# Patient Record
Sex: FEMALE | Race: WHITE | NOT HISPANIC OR LATINO | Employment: FULL TIME | URBAN - NONMETROPOLITAN AREA
[De-identification: names, ages, dates, MRNs, and addresses within clinical notes are randomized per-mention and may not be internally consistent; named-entity substitution may affect disease eponyms.]

---

## 2017-09-13 ENCOUNTER — OFFICE VISIT (OUTPATIENT)
Dept: FAMILY MEDICINE CLINIC | Facility: CLINIC | Age: 47
End: 2017-09-13

## 2017-09-13 ENCOUNTER — APPOINTMENT (OUTPATIENT)
Dept: LAB | Facility: HOSPITAL | Age: 47
End: 2017-09-13

## 2017-09-13 VITALS
SYSTOLIC BLOOD PRESSURE: 128 MMHG | WEIGHT: 155 LBS | HEIGHT: 60 IN | DIASTOLIC BLOOD PRESSURE: 80 MMHG | BODY MASS INDEX: 30.43 KG/M2

## 2017-09-13 DIAGNOSIS — R53.81 MALAISE: ICD-10-CM

## 2017-09-13 DIAGNOSIS — M10.072 ACUTE IDIOPATHIC GOUT OF LEFT FOOT: Primary | ICD-10-CM

## 2017-09-13 LAB
25(OH)D3 SERPL-MCNC: 34 NG/ML (ref 30–100)
ALBUMIN SERPL-MCNC: 4.3 G/DL (ref 3.4–4.8)
ALBUMIN/GLOB SERPL: 1.3 G/DL (ref 1.1–1.8)
ALP SERPL-CCNC: 71 U/L (ref 38–126)
ALT SERPL W P-5'-P-CCNC: 35 U/L (ref 9–52)
ANION GAP SERPL CALCULATED.3IONS-SCNC: 11 MMOL/L (ref 5–15)
AST SERPL-CCNC: 34 U/L (ref 14–36)
BASOPHILS # BLD AUTO: 0.02 10*3/MM3 (ref 0–0.2)
BASOPHILS NFR BLD AUTO: 0.3 % (ref 0–2)
BILIRUB SERPL-MCNC: 1.6 MG/DL (ref 0.2–1.3)
BUN BLD-MCNC: 11 MG/DL (ref 7–21)
BUN/CREAT SERPL: 13.6 (ref 7–25)
CALCIUM SPEC-SCNC: 8.9 MG/DL (ref 8.4–10.2)
CHLORIDE SERPL-SCNC: 107 MMOL/L (ref 95–110)
CO2 SERPL-SCNC: 22 MMOL/L (ref 22–31)
CREAT BLD-MCNC: 0.81 MG/DL (ref 0.5–1)
DEPRECATED RDW RBC AUTO: 41.6 FL (ref 36.4–46.3)
EOSINOPHIL # BLD AUTO: 0.06 10*3/MM3 (ref 0–0.7)
EOSINOPHIL NFR BLD AUTO: 0.8 % (ref 0–7)
ERYTHROCYTE [DISTWIDTH] IN BLOOD BY AUTOMATED COUNT: 12.9 % (ref 11.5–14.5)
GFR SERPL CREATININE-BSD FRML MDRD: 76 ML/MIN/1.73 (ref 58–135)
GLOBULIN UR ELPH-MCNC: 3.2 GM/DL (ref 2.3–3.5)
GLUCOSE BLD-MCNC: 90 MG/DL (ref 60–100)
HCT VFR BLD AUTO: 40.1 % (ref 35–45)
HGB BLD-MCNC: 13.6 G/DL (ref 12–15.5)
IMM GRANULOCYTES # BLD: 0.01 10*3/MM3 (ref 0–0.02)
IMM GRANULOCYTES NFR BLD: 0.1 % (ref 0–0.5)
LYMPHOCYTES # BLD AUTO: 2.04 10*3/MM3 (ref 0.6–4.2)
LYMPHOCYTES NFR BLD AUTO: 26.8 % (ref 10–50)
MCH RBC QN AUTO: 30.1 PG (ref 26.5–34)
MCHC RBC AUTO-ENTMCNC: 33.9 G/DL (ref 31.4–36)
MCV RBC AUTO: 88.7 FL (ref 80–98)
MONOCYTES # BLD AUTO: 0.64 10*3/MM3 (ref 0–0.9)
MONOCYTES NFR BLD AUTO: 8.4 % (ref 0–12)
NEUTROPHILS # BLD AUTO: 4.83 10*3/MM3 (ref 2–8.6)
NEUTROPHILS NFR BLD AUTO: 63.6 % (ref 37–80)
PLATELET # BLD AUTO: 187 10*3/MM3 (ref 150–450)
PMV BLD AUTO: 11.5 FL (ref 8–12)
POTASSIUM BLD-SCNC: 4.2 MMOL/L (ref 3.5–5.1)
PROT SERPL-MCNC: 7.5 G/DL (ref 6.3–8.6)
PTH-INTACT SERPL-MCNC: 68.1 PG/ML (ref 10–65)
RBC # BLD AUTO: 4.52 10*6/MM3 (ref 3.77–5.16)
SODIUM BLD-SCNC: 140 MMOL/L (ref 137–145)
TSH SERPL DL<=0.05 MIU/L-ACNC: 2.85 MIU/ML (ref 0.46–4.68)
URATE SERPL-MCNC: 4.2 MG/DL (ref 2.5–8.5)
WBC NRBC COR # BLD: 7.6 10*3/MM3 (ref 3.2–9.8)

## 2017-09-13 PROCEDURE — 83970 ASSAY OF PARATHORMONE: CPT | Performed by: NURSE PRACTITIONER

## 2017-09-13 PROCEDURE — 84443 ASSAY THYROID STIM HORMONE: CPT | Performed by: NURSE PRACTITIONER

## 2017-09-13 PROCEDURE — 96372 THER/PROPH/DIAG INJ SC/IM: CPT | Performed by: NURSE PRACTITIONER

## 2017-09-13 PROCEDURE — 85025 COMPLETE CBC W/AUTO DIFF WBC: CPT | Performed by: NURSE PRACTITIONER

## 2017-09-13 PROCEDURE — 82306 VITAMIN D 25 HYDROXY: CPT | Performed by: NURSE PRACTITIONER

## 2017-09-13 PROCEDURE — 99213 OFFICE O/P EST LOW 20 MIN: CPT | Performed by: NURSE PRACTITIONER

## 2017-09-13 PROCEDURE — 84550 ASSAY OF BLOOD/URIC ACID: CPT | Performed by: NURSE PRACTITIONER

## 2017-09-13 PROCEDURE — 80053 COMPREHEN METABOLIC PANEL: CPT | Performed by: NURSE PRACTITIONER

## 2017-09-13 PROCEDURE — 36415 COLL VENOUS BLD VENIPUNCTURE: CPT | Performed by: NURSE PRACTITIONER

## 2017-09-13 RX ORDER — IBUPROFEN 200 MG
200 TABLET ORAL EVERY 6 HOURS PRN
COMMUNITY
End: 2018-03-22

## 2017-09-13 RX ORDER — METHYLPREDNISOLONE 4 MG/1
TABLET ORAL
Qty: 21 EACH | Refills: 0 | Status: SHIPPED | OUTPATIENT
Start: 2017-09-13 | End: 2018-03-22

## 2017-09-13 RX ORDER — TRIAMCINOLONE ACETONIDE 40 MG/ML
80 INJECTION, SUSPENSION INTRA-ARTICULAR; INTRAMUSCULAR ONCE
Status: COMPLETED | OUTPATIENT
Start: 2017-09-13 | End: 2017-09-13

## 2017-09-13 RX ADMIN — TRIAMCINOLONE ACETONIDE 80 MG: 40 INJECTION, SUSPENSION INTRA-ARTICULAR; INTRAMUSCULAR at 12:00

## 2017-09-13 NOTE — PROGRESS NOTES
Chief Complaint   Patient presents with   • Foot Pain     started on Sat and great toe pain left foot     Subjective   Karena Kent is a 47 y.o. female.     Foot Pain   This is a recurrent problem. The current episode started 1 to 4 weeks ago. The problem occurs constantly. The problem has been gradually worsening. Associated symptoms include arthralgias, fatigue, joint swelling and myalgias. Pertinent negatives include no abdominal pain, anorexia, change in bowel habit, chest pain, chills, congestion, coughing, diaphoresis, fever, headaches, nausea, neck pain, numbness, rash, sore throat, visual change, vomiting or weakness. Nothing aggravates the symptoms. Treatments tried: left foot pain  The treatment provided no relief.        The following portions of the patient's history were reviewed and updated as appropriate: allergies, current medications, past social history and problem list.    Review of Systems   Constitutional: Positive for fatigue. Negative for chills, diaphoresis and fever.   HENT: Negative.  Negative for congestion and sore throat.    Eyes: Negative.    Respiratory: Negative.  Negative for apnea, cough, choking, chest tightness, shortness of breath, wheezing and stridor.    Cardiovascular: Negative.  Negative for chest pain, palpitations and leg swelling.   Gastrointestinal: Negative.  Negative for abdominal pain, anorexia, change in bowel habit, nausea and vomiting.   Endocrine: Negative.    Genitourinary: Negative.    Musculoskeletal: Positive for arthralgias, back pain, gait problem, joint swelling and myalgias. Negative for neck pain and neck stiffness.   Skin: Negative.  Negative for rash.   Allergic/Immunologic: Negative.  Negative for environmental allergies, food allergies and immunocompromised state.   Neurological: Negative for weakness, numbness and headaches.   Hematological: Negative.    Psychiatric/Behavioral: Negative.    All other systems reviewed and are  "negative.      Objective   /80  Ht 60\" (152.4 cm)  Wt 155 lb (70.3 kg)  BMI 30.27 kg/m2  Physical Exam   Constitutional: She appears well-developed.   HENT:   Head: Normocephalic.   Eyes: Pupils are equal, round, and reactive to light.   Neck: Normal range of motion.   Cardiovascular: Normal rate, regular rhythm, normal heart sounds and normal pulses.    Pulmonary/Chest: Effort normal.   Abdominal: Soft.   Genitourinary: Rectum normal and vagina normal. Pelvic exam was performed with patient supine. Uterus is not deviated, not enlarged, not fixed and not tender. Cervix exhibits no motion tenderness, no discharge and no friability. Right adnexum displays no mass. Left adnexum displays no mass.   Musculoskeletal: Normal range of motion. She exhibits tenderness.        Neurological: She is alert.   Psychiatric: She has a normal mood and affect.   Nursing note and vitals reviewed.      Assessment/Plan   Problem List Items Addressed This Visit        Musculoskeletal and Integument    Acute idiopathic gout of left foot - Primary    Relevant Medications    triamcinolone acetonide (KENALOG-40) injection 80 mg (Completed)    Other Relevant Orders    TSH    Comprehensive Metabolic Panel (Completed)    CBC & Differential (Completed)    Vitamin D 25 Hydroxy    Uric acid (Completed)    PTH, Intact    CBC Auto Differential (Completed)       Other    Malaise    Relevant Orders    TSH    Comprehensive Metabolic Panel (Completed)    CBC & Differential (Completed)    Vitamin D 25 Hydroxy    Uric acid (Completed)    PTH, Intact    CBC Auto Differential (Completed)           New Medications Ordered This Visit   Medications   • ibuprofen (ADVIL,MOTRIN) 200 MG tablet     Sig: Take 200 mg by mouth Every 6 (Six) Hours As Needed for Mild Pain .   • triamcinolone acetonide (KENALOG-40) injection 80 mg      ice to area, meds as directed   "

## 2017-09-14 DIAGNOSIS — M10.00 ACUTE IDIOPATHIC GOUT, UNSPECIFIED SITE: Primary | ICD-10-CM

## 2017-09-14 DIAGNOSIS — Z00.00 GENERAL MEDICAL EXAM: ICD-10-CM

## 2017-10-15 ENCOUNTER — LAB (OUTPATIENT)
Dept: LAB | Facility: HOSPITAL | Age: 47
End: 2017-10-15

## 2017-10-15 DIAGNOSIS — M10.00 ACUTE IDIOPATHIC GOUT, UNSPECIFIED SITE: ICD-10-CM

## 2017-10-15 DIAGNOSIS — Z00.00 GENERAL MEDICAL EXAM: ICD-10-CM

## 2017-10-15 LAB
ARTICHOKE IGE QN: 90 MG/DL (ref 1–129)
CHOLEST SERPL-MCNC: 183 MG/DL (ref 0–199)
HDLC SERPL-MCNC: 62 MG/DL (ref 60–200)
LDLC/HDLC SERPL: 1.62 {RATIO} (ref 0–3.22)
TRIGL SERPL-MCNC: 104 MG/DL (ref 20–199)
TSH SERPL DL<=0.05 MIU/L-ACNC: 2.93 MIU/ML (ref 0.46–4.68)

## 2017-10-15 PROCEDURE — 83970 ASSAY OF PARATHORMONE: CPT

## 2017-10-15 PROCEDURE — 36415 COLL VENOUS BLD VENIPUNCTURE: CPT | Performed by: NURSE PRACTITIONER

## 2017-10-15 PROCEDURE — 36415 COLL VENOUS BLD VENIPUNCTURE: CPT

## 2017-10-15 PROCEDURE — 80061 LIPID PANEL: CPT | Performed by: NURSE PRACTITIONER

## 2017-10-15 PROCEDURE — 84443 ASSAY THYROID STIM HORMONE: CPT | Performed by: NURSE PRACTITIONER

## 2017-10-15 PROCEDURE — 82310 ASSAY OF CALCIUM: CPT

## 2017-10-18 LAB
CALCIUM SPEC-SCNC: 9.5 MG/DL (ref 8.4–10.2)
PTH-INTACT SERPL-MCNC: 40 PG/ML (ref 10–65)

## 2018-02-15 ENCOUNTER — TELEPHONE (OUTPATIENT)
Dept: FAMILY MEDICINE CLINIC | Facility: CLINIC | Age: 48
End: 2018-02-15

## 2018-02-15 DIAGNOSIS — Z12.31 ENCOUNTER FOR SCREENING MAMMOGRAM FOR MALIGNANT NEOPLASM OF BREAST: Primary | ICD-10-CM

## 2018-03-22 ENCOUNTER — OFFICE VISIT (OUTPATIENT)
Dept: FAMILY MEDICINE CLINIC | Facility: CLINIC | Age: 48
End: 2018-03-22

## 2018-03-22 ENCOUNTER — APPOINTMENT (OUTPATIENT)
Dept: LAB | Facility: HOSPITAL | Age: 48
End: 2018-03-22

## 2018-03-22 VITALS
BODY MASS INDEX: 29.45 KG/M2 | SYSTOLIC BLOOD PRESSURE: 118 MMHG | HEIGHT: 60 IN | DIASTOLIC BLOOD PRESSURE: 82 MMHG | WEIGHT: 150 LBS

## 2018-03-22 DIAGNOSIS — Z00.00 WELLNESS EXAMINATION: Primary | ICD-10-CM

## 2018-03-22 LAB
25(OH)D3 SERPL-MCNC: 21.5 NG/ML (ref 30–100)
ALBUMIN SERPL-MCNC: 4.2 G/DL (ref 3.4–4.8)
ALBUMIN/GLOB SERPL: 1.3 G/DL (ref 1.1–1.8)
ALP SERPL-CCNC: 69 U/L (ref 38–126)
ALT SERPL W P-5'-P-CCNC: 33 U/L (ref 9–52)
ANION GAP SERPL CALCULATED.3IONS-SCNC: 15 MMOL/L (ref 5–15)
ARTICHOKE IGE QN: 94 MG/DL (ref 1–129)
AST SERPL-CCNC: 76 U/L (ref 14–36)
BASOPHILS # BLD AUTO: 0.01 10*3/MM3 (ref 0–0.2)
BASOPHILS NFR BLD AUTO: 0.1 % (ref 0–2)
BILIRUB SERPL-MCNC: 0.9 MG/DL (ref 0.2–1.3)
BUN BLD-MCNC: 12 MG/DL (ref 7–21)
BUN/CREAT SERPL: 16 (ref 7–25)
CALCIUM SPEC-SCNC: 8.9 MG/DL (ref 8.4–10.2)
CHLORIDE SERPL-SCNC: 103 MMOL/L (ref 95–110)
CHOLEST SERPL-MCNC: 189 MG/DL (ref 0–199)
CO2 SERPL-SCNC: 23 MMOL/L (ref 22–31)
CREAT BLD-MCNC: 0.75 MG/DL (ref 0.5–1)
DEPRECATED RDW RBC AUTO: 42.3 FL (ref 36.4–46.3)
EOSINOPHIL # BLD AUTO: 0.02 10*3/MM3 (ref 0–0.7)
EOSINOPHIL NFR BLD AUTO: 0.2 % (ref 0–7)
ERYTHROCYTE [DISTWIDTH] IN BLOOD BY AUTOMATED COUNT: 12.8 % (ref 11.5–14.5)
GFR SERPL CREATININE-BSD FRML MDRD: 83 ML/MIN/1.73 (ref 58–135)
GLOBULIN UR ELPH-MCNC: 3.3 GM/DL (ref 2.3–3.5)
GLUCOSE BLD-MCNC: 86 MG/DL (ref 60–100)
HCT VFR BLD AUTO: 41.1 % (ref 35–45)
HDLC SERPL-MCNC: 55 MG/DL (ref 60–200)
HGB BLD-MCNC: 14.2 G/DL (ref 12–15.5)
IMM GRANULOCYTES # BLD: 0.02 10*3/MM3 (ref 0–0.02)
IMM GRANULOCYTES NFR BLD: 0.2 % (ref 0–0.5)
IRON 24H UR-MRATE: 69 MCG/DL (ref 37–170)
LDLC/HDLC SERPL: 1.82 {RATIO} (ref 0–3.22)
LYMPHOCYTES # BLD AUTO: 2.19 10*3/MM3 (ref 0.6–4.2)
LYMPHOCYTES NFR BLD AUTO: 26.6 % (ref 10–50)
MAGNESIUM SERPL-MCNC: 1.9 MG/DL (ref 1.6–2.3)
MCH RBC QN AUTO: 31.1 PG (ref 26.5–34)
MCHC RBC AUTO-ENTMCNC: 34.5 G/DL (ref 31.4–36)
MCV RBC AUTO: 90.1 FL (ref 80–98)
MONOCYTES # BLD AUTO: 0.49 10*3/MM3 (ref 0–0.9)
MONOCYTES NFR BLD AUTO: 5.9 % (ref 0–12)
NEUTROPHILS # BLD AUTO: 5.51 10*3/MM3 (ref 2–8.6)
NEUTROPHILS NFR BLD AUTO: 67 % (ref 37–80)
PLATELET # BLD AUTO: 218 10*3/MM3 (ref 150–450)
PMV BLD AUTO: 11.2 FL (ref 8–12)
POTASSIUM BLD-SCNC: 3.6 MMOL/L (ref 3.5–5.1)
PROT SERPL-MCNC: 7.5 G/DL (ref 6.3–8.6)
RBC # BLD AUTO: 4.56 10*6/MM3 (ref 3.77–5.16)
SODIUM BLD-SCNC: 141 MMOL/L (ref 137–145)
TRIGL SERPL-MCNC: 169 MG/DL (ref 20–199)
TSH SERPL DL<=0.05 MIU/L-ACNC: 0.44 MIU/ML (ref 0.46–4.68)
VIT B12 BLD-MCNC: 477 PG/ML (ref 239–931)
WBC NRBC COR # BLD: 8.24 10*3/MM3 (ref 3.2–9.8)

## 2018-03-22 PROCEDURE — 80061 LIPID PANEL: CPT | Performed by: NURSE PRACTITIONER

## 2018-03-22 PROCEDURE — 85025 COMPLETE CBC W/AUTO DIFF WBC: CPT | Performed by: NURSE PRACTITIONER

## 2018-03-22 PROCEDURE — 83540 ASSAY OF IRON: CPT | Performed by: NURSE PRACTITIONER

## 2018-03-22 PROCEDURE — 83735 ASSAY OF MAGNESIUM: CPT | Performed by: NURSE PRACTITIONER

## 2018-03-22 PROCEDURE — 82607 VITAMIN B-12: CPT | Performed by: NURSE PRACTITIONER

## 2018-03-22 PROCEDURE — 82306 VITAMIN D 25 HYDROXY: CPT | Performed by: NURSE PRACTITIONER

## 2018-03-22 PROCEDURE — 99396 PREV VISIT EST AGE 40-64: CPT | Performed by: NURSE PRACTITIONER

## 2018-03-22 PROCEDURE — 84443 ASSAY THYROID STIM HORMONE: CPT | Performed by: NURSE PRACTITIONER

## 2018-03-22 PROCEDURE — 90715 TDAP VACCINE 7 YRS/> IM: CPT | Performed by: NURSE PRACTITIONER

## 2018-03-22 PROCEDURE — 90471 IMMUNIZATION ADMIN: CPT | Performed by: NURSE PRACTITIONER

## 2018-03-22 PROCEDURE — 36415 COLL VENOUS BLD VENIPUNCTURE: CPT | Performed by: NURSE PRACTITIONER

## 2018-03-22 PROCEDURE — 80053 COMPREHEN METABOLIC PANEL: CPT | Performed by: NURSE PRACTITIONER

## 2018-03-22 NOTE — PROGRESS NOTES
"  Chief Complaint   Patient presents with   • Annual Exam     yearly check up , mammogram ordered for today     Subjective   Karena Kent is a 48 y.o. female.     General exam for health care needs -no major complaints          The following portions of the patient's history were reviewed and updated as appropriate: allergies, current medications, past social history and problem list.    Review of Systems   Constitutional: Positive for fatigue. Negative for activity change, appetite change, chills, diaphoresis, fever and unexpected weight change.   HENT: Negative.    Eyes: Negative.    Respiratory: Negative.  Negative for apnea, cough, choking, chest tightness, wheezing and stridor.    Cardiovascular: Negative.  Negative for chest pain, palpitations and leg swelling.   Gastrointestinal: Negative.  Negative for abdominal distention and abdominal pain.   Endocrine: Negative.  Negative for cold intolerance, heat intolerance, polydipsia, polyphagia and polyuria.   Genitourinary: Negative.    Musculoskeletal: Negative.    Skin: Negative.    Allergic/Immunologic: Negative.    Neurological: Negative.    Hematological: Negative.    Psychiatric/Behavioral: Negative.    All other systems reviewed and are negative.      Objective   /82   Ht 152.4 cm (60\")   Wt 68 kg (150 lb)   BMI 29.29 kg/m²   Physical Exam   Constitutional: She is oriented to person, place, and time. She appears well-developed and well-nourished. No distress.   HENT:   Head: Normocephalic and atraumatic.   Mouth/Throat: No oropharyngeal exudate.   Eyes: EOM are normal. Pupils are equal, round, and reactive to light. Right eye exhibits no discharge. Left eye exhibits no discharge. No scleral icterus.   Neck: Normal range of motion. Neck supple.   Cardiovascular: Normal rate, regular rhythm, normal heart sounds and intact distal pulses.  Exam reveals no gallop and no friction rub.    No murmur heard.  Pulmonary/Chest: Effort normal and breath " sounds normal.   Abdominal: Soft. Bowel sounds are normal.   Musculoskeletal: Normal range of motion. She exhibits no edema, tenderness or deformity.   Neurological: She is alert and oriented to person, place, and time. She has normal reflexes. She displays normal reflexes. No cranial nerve deficit. She exhibits normal muscle tone. Coordination normal.   Skin: Skin is warm and dry. She is not diaphoretic.   Nursing note and vitals reviewed.      Assessment/Plan   Problem List Items Addressed This Visit        Other    General medical exam - Primary      Other Visit Diagnoses    None.        No orders of the defined types were placed in this encounter.      It's not just what you eat, but when you eat  Eat breakfast, and eat smaller meals throughout the day. A healthy breakfast can jumpstart your metabolism, while eating small, healthy meals (rather than the standard three large meals) keeps your energy up.   Avoid eating at night. Try to eat dinner earlier and fast for 14-16 hours until breakfast the next morning. Studies suggest that eating only when you’re most active and giving your digestive system a long break each day may help to regulate weight.     Current Outpatient Prescriptions on File Prior to Visit   Medication Sig Dispense Refill   • Biotin 2500 MCG capsule Take  by mouth.     • Multiple Vitamins-Minerals (MULTIVITAMIN ADULT PO) Take  by mouth.     • [DISCONTINUED] diclofenac (VOLTAREN) 50 MG EC tablet Take 1 tablet by mouth 2 (Two) Times a Day. 60 tablet 2   • [DISCONTINUED] ibuprofen (ADVIL,MOTRIN) 200 MG tablet Take 200 mg by mouth Every 6 (Six) Hours As Needed for Mild Pain .     • [DISCONTINUED] MethylPREDNISolone (MEDROL, JESUS,) 4 MG tablet Take as directed on package instructions. 21 each 0     No current facility-administered medications on file prior to visit.        NO CHANGES NEEDED AT THIS TIME, WILL FOLLOW UP AS DIRECTED ONCE  A YEAR, MAMMOGRAM TODAY

## 2018-07-19 ENCOUNTER — APPOINTMENT (OUTPATIENT)
Dept: LAB | Facility: HOSPITAL | Age: 48
End: 2018-07-19

## 2018-07-19 ENCOUNTER — OFFICE VISIT (OUTPATIENT)
Dept: FAMILY MEDICINE CLINIC | Facility: CLINIC | Age: 48
End: 2018-07-19

## 2018-07-19 VITALS
WEIGHT: 140.8 LBS | BODY MASS INDEX: 27.64 KG/M2 | HEIGHT: 60 IN | SYSTOLIC BLOOD PRESSURE: 120 MMHG | DIASTOLIC BLOOD PRESSURE: 82 MMHG

## 2018-07-19 DIAGNOSIS — H53.9 VISION CHANGES: ICD-10-CM

## 2018-07-19 DIAGNOSIS — R23.2 HOT FLASHES: ICD-10-CM

## 2018-07-19 DIAGNOSIS — R51.9 NEW ONSET HEADACHE: Primary | ICD-10-CM

## 2018-07-19 DIAGNOSIS — R51.9 NEW ONSET OF HEADACHES: ICD-10-CM

## 2018-07-19 DIAGNOSIS — H57.13 PAIN OF BOTH EYES: Primary | ICD-10-CM

## 2018-07-19 DIAGNOSIS — H57.13 PAIN OF BOTH EYES: ICD-10-CM

## 2018-07-19 LAB
25(OH)D3 SERPL-MCNC: 43.3 NG/ML (ref 30–100)
ALBUMIN SERPL-MCNC: 4.6 G/DL (ref 3.4–4.8)
ALBUMIN/GLOB SERPL: 1.4 G/DL (ref 1.1–1.8)
ALP SERPL-CCNC: 57 U/L (ref 38–126)
ALT SERPL W P-5'-P-CCNC: 18 U/L (ref 9–52)
ANION GAP SERPL CALCULATED.3IONS-SCNC: 11 MMOL/L (ref 5–15)
ARTICHOKE IGE QN: 80 MG/DL (ref 1–129)
AST SERPL-CCNC: 35 U/L (ref 14–36)
BASOPHILS # BLD AUTO: 0.02 10*3/MM3 (ref 0–0.2)
BASOPHILS NFR BLD AUTO: 0.4 % (ref 0–2)
BILIRUB SERPL-MCNC: 1.1 MG/DL (ref 0.2–1.3)
BUN BLD-MCNC: 18 MG/DL (ref 7–21)
BUN/CREAT SERPL: 25 (ref 7–25)
CALCIUM SPEC-SCNC: 8.9 MG/DL (ref 8.4–10.2)
CHLORIDE SERPL-SCNC: 106 MMOL/L (ref 95–110)
CHOLEST SERPL-MCNC: 158 MG/DL (ref 0–199)
CO2 SERPL-SCNC: 21 MMOL/L (ref 22–31)
CREAT BLD-MCNC: 0.72 MG/DL (ref 0.5–1)
DEPRECATED RDW RBC AUTO: 40.8 FL (ref 36.4–46.3)
EOSINOPHIL # BLD AUTO: 0.04 10*3/MM3 (ref 0–0.7)
EOSINOPHIL NFR BLD AUTO: 0.8 % (ref 0–7)
ERYTHROCYTE [DISTWIDTH] IN BLOOD BY AUTOMATED COUNT: 12.9 % (ref 11.5–14.5)
GFR SERPL CREATININE-BSD FRML MDRD: 87 ML/MIN/1.73 (ref 58–135)
GLOBULIN UR ELPH-MCNC: 3.3 GM/DL (ref 2.3–3.5)
GLUCOSE BLD-MCNC: 88 MG/DL (ref 60–100)
HCT VFR BLD AUTO: 40.7 % (ref 35–45)
HDLC SERPL-MCNC: 43 MG/DL (ref 60–200)
HGB BLD-MCNC: 14.2 G/DL (ref 12–15.5)
IMM GRANULOCYTES # BLD: 0.01 10*3/MM3 (ref 0–0.02)
IMM GRANULOCYTES NFR BLD: 0.2 % (ref 0–0.5)
LDLC/HDLC SERPL: 2.21 {RATIO} (ref 0–3.22)
LYMPHOCYTES # BLD AUTO: 1.66 10*3/MM3 (ref 0.6–4.2)
LYMPHOCYTES NFR BLD AUTO: 34.2 % (ref 10–50)
MCH RBC QN AUTO: 30.5 PG (ref 26.5–34)
MCHC RBC AUTO-ENTMCNC: 34.9 G/DL (ref 31.4–36)
MCV RBC AUTO: 87.3 FL (ref 80–98)
MONOCYTES # BLD AUTO: 0.36 10*3/MM3 (ref 0–0.9)
MONOCYTES NFR BLD AUTO: 7.4 % (ref 0–12)
NEUTROPHILS # BLD AUTO: 2.77 10*3/MM3 (ref 2–8.6)
NEUTROPHILS NFR BLD AUTO: 57 % (ref 37–80)
NRBC BLD MANUAL-RTO: 0 /100 WBC (ref 0–0)
PLATELET # BLD AUTO: 195 10*3/MM3 (ref 150–450)
PMV BLD AUTO: 11.3 FL (ref 8–12)
POTASSIUM BLD-SCNC: 4 MMOL/L (ref 3.5–5.1)
PROT SERPL-MCNC: 7.9 G/DL (ref 6.3–8.6)
RBC # BLD AUTO: 4.66 10*6/MM3 (ref 3.77–5.16)
SODIUM BLD-SCNC: 138 MMOL/L (ref 137–145)
TRIGL SERPL-MCNC: 100 MG/DL (ref 20–199)
TSH SERPL DL<=0.05 MIU/L-ACNC: 1.45 MIU/ML (ref 0.46–4.68)
VIT B12 BLD-MCNC: 703 PG/ML (ref 239–931)
WBC NRBC COR # BLD: 4.86 10*3/MM3 (ref 3.2–9.8)

## 2018-07-19 PROCEDURE — 82607 VITAMIN B-12: CPT | Performed by: NURSE PRACTITIONER

## 2018-07-19 PROCEDURE — 82670 ASSAY OF TOTAL ESTRADIOL: CPT | Performed by: NURSE PRACTITIONER

## 2018-07-19 PROCEDURE — 85025 COMPLETE CBC W/AUTO DIFF WBC: CPT | Performed by: NURSE PRACTITIONER

## 2018-07-19 PROCEDURE — 84443 ASSAY THYROID STIM HORMONE: CPT | Performed by: NURSE PRACTITIONER

## 2018-07-19 PROCEDURE — 82306 VITAMIN D 25 HYDROXY: CPT | Performed by: NURSE PRACTITIONER

## 2018-07-19 PROCEDURE — 36415 COLL VENOUS BLD VENIPUNCTURE: CPT | Performed by: NURSE PRACTITIONER

## 2018-07-19 PROCEDURE — 84144 ASSAY OF PROGESTERONE: CPT | Performed by: NURSE PRACTITIONER

## 2018-07-19 PROCEDURE — 80061 LIPID PANEL: CPT | Performed by: NURSE PRACTITIONER

## 2018-07-19 PROCEDURE — 99213 OFFICE O/P EST LOW 20 MIN: CPT | Performed by: NURSE PRACTITIONER

## 2018-07-19 PROCEDURE — 84403 ASSAY OF TOTAL TESTOSTERONE: CPT | Performed by: NURSE PRACTITIONER

## 2018-07-19 PROCEDURE — 84402 ASSAY OF FREE TESTOSTERONE: CPT | Performed by: NURSE PRACTITIONER

## 2018-07-19 PROCEDURE — 80053 COMPREHEN METABOLIC PANEL: CPT | Performed by: NURSE PRACTITIONER

## 2018-07-19 RX ORDER — PAROXETINE 10 MG/1
10 TABLET, FILM COATED ORAL EVERY MORNING
Qty: 30 TABLET | Refills: 11 | Status: SHIPPED | OUTPATIENT
Start: 2018-07-19 | End: 2019-11-25 | Stop reason: SDUPTHER

## 2018-07-19 NOTE — PROGRESS NOTES
"  Chief Complaint   Patient presents with   • Eye pain     keeps recurring      Subjective   Karena Kent is a 48 y.o. female.     Presents with sudden onset headache 7 days ago-brought tears to her eyes-now having vision changes left eye-reports \"feeling like something is stabbing in my left eye\" -no falls or injuries-no hx of the same-does not wear contacts-headache and vision changes are worsening       Eye Pain    Both eyes are affected.This is a recurrent problem. The current episode started more than 1 month ago. The problem occurs intermittently. The problem has been gradually worsening. There was no injury mechanism. The pain is at a severity of 6/10. The pain is moderate. There is no known exposure to pink eye. She does not wear contacts. Associated symptoms include blurred vision and photophobia. Pertinent negatives include no eye discharge, double vision, eye redness, fever, foreign body sensation, itching, nausea, recent URI, tingling, vomiting or weakness. She has tried nothing for the symptoms. The treatment provided mild relief.   Headache    This is a new problem. The current episode started in the past 7 days. The problem occurs constantly. The problem has been rapidly worsening. The pain is located in the parietal, temporal and frontal region. The pain radiates to the face. The pain quality is not similar to prior headaches. The quality of the pain is described as stabbing, throbbing and thunderclap. Associated symptoms include blurred vision, dizziness, eye pain, a loss of balance, photophobia and a visual change. Pertinent negatives include no abdominal pain, abnormal behavior, anorexia, back pain, coughing, drainage, ear pain, eye redness, facial sweating, fever, hearing loss, insomnia, muscle aches, nausea, neck pain, numbness, scalp tenderness, seizures, sinus pressure, sore throat, swollen glands, tingling, tinnitus, vomiting or weakness.        The following portions of the " "patient's history were reviewed and updated as appropriate: allergies, current medications, past social history and problem list.    Review of Systems   Constitutional: Negative for activity change, appetite change, chills, diaphoresis, fatigue, fever and unexpected weight change.   HENT: Negative for congestion, dental problem, drooling, ear discharge, ear pain, facial swelling, hearing loss, sinus pressure, sore throat and tinnitus.    Eyes: Positive for blurred vision, photophobia, pain and visual disturbance. Negative for double vision, discharge, redness and itching.   Respiratory: Negative.  Negative for apnea, cough, choking, chest tightness, shortness of breath, wheezing and stridor.    Cardiovascular: Negative.  Negative for chest pain, palpitations and leg swelling.   Gastrointestinal: Negative.  Negative for abdominal pain, anorexia, nausea and vomiting.   Endocrine: Negative.  Negative for cold intolerance, heat intolerance, polydipsia, polyphagia and polyuria.   Genitourinary: Positive for menstrual problem. Negative for decreased urine volume, difficulty urinating, dyspareunia, dysuria, enuresis, flank pain, frequency, genital sores, pelvic pain, urgency, vaginal bleeding, vaginal discharge and vaginal pain.        Increase in hot flashes hx of ablation    Musculoskeletal: Negative.  Negative for back pain and neck pain.   Skin: Negative.    Allergic/Immunologic: Negative.  Negative for environmental allergies, food allergies and immunocompromised state.   Neurological: Positive for dizziness, headaches and loss of balance. Negative for tingling, seizures, facial asymmetry, weakness and numbness.   Hematological: Negative.  Negative for adenopathy. Does not bruise/bleed easily.   Psychiatric/Behavioral: Negative.  The patient does not have insomnia.        Objective   /82   Ht 152.4 cm (60\")   Wt 63.9 kg (140 lb 12.8 oz)   BMI 27.50 kg/m²   Physical Exam   Constitutional: She is oriented to " person, place, and time. Vital signs are normal. She appears ill.   HENT:   Head: Normocephalic and atraumatic.   Mouth/Throat: No oropharyngeal exudate.   Eyes: Pupils are equal, round, and reactive to light. EOM are normal. Left eye exhibits no discharge. No scleral icterus.   Neck: Normal range of motion. Neck supple.   Cardiovascular: Normal rate, regular rhythm and normal heart sounds.  Exam reveals no gallop and no friction rub.    No murmur heard.  Pulmonary/Chest: Effort normal and breath sounds normal. No respiratory distress. She has no wheezes. She has no rales. She exhibits no tenderness.   Abdominal: Soft. Bowel sounds are normal.   Musculoskeletal: Normal range of motion.   Neurological: She is alert and oriented to person, place, and time.   Skin: Skin is warm.   Nursing note and vitals reviewed.      Assessment/Plan   Problem List Items Addressed This Visit        Cardiovascular and Mediastinum    Hot flashes    Relevant Orders    Progesterone    Estradiol    Testosterone, F Eqlib & T LC / MS    CBC & Differential    Comprehensive Metabolic Panel    Lipid Panel    Vitamin B12    TSH    Vitamin D 25 Hydroxy    CBC Auto Differential       Nervous and Auditory    Pain of both eyes    Relevant Orders    MRI brain wo contrast    Progesterone    Estradiol    Testosterone, F Eqlib & T LC / MS    CBC & Differential    Comprehensive Metabolic Panel    Lipid Panel    Vitamin B12    TSH    Vitamin D 25 Hydroxy    CBC Auto Differential    New onset headache - Primary    Relevant Orders    MRI brain wo contrast    Progesterone    Estradiol    Testosterone, F Eqlib & T LC / MS    CBC & Differential    Comprehensive Metabolic Panel    Lipid Panel    Vitamin B12    TSH    Vitamin D 25 Hydroxy    CBC Auto Differential       Other    Vision changes    Relevant Orders    MRI brain wo contrast    Progesterone    Estradiol    Testosterone, F Eqlib & T LC / MS    CBC & Differential    Comprehensive Metabolic Panel     Lipid Panel    Vitamin B12    TSH    Vitamin D 25 Hydroxy    CBC Auto Differential           New Medications Ordered This Visit   Medications   • PARoxetine (PAXIL) 10 MG tablet     Sig: Take 1 tablet by mouth Every Morning.     Dispense:  30 tablet     Refill:  11       It's not just what you eat, but when you eat  Eat breakfast, and eat smaller meals throughout the day. A healthy breakfast can jumpstart your metabolism, while eating small, healthy meals (rather than the standard three large meals) keeps your energy up.   Avoid eating at night. Try to eat dinner earlier and fast for 14-16 hours until breakfast the next morning. Studies suggest that eating only when you’re most active and giving your digestive system a long break each day may help to regulate weight.     Add paxil for now and see if this helps her hot flashes      Mri brain as directed, er if worsen, see opthamologist  soon -patient agrees -ER IF worsen

## 2018-07-20 LAB
ESTRADIOL SERPL HS-MCNC: 41.5 PG/ML
PROGEST SERPL-MCNC: 0.2 NG/ML

## 2018-07-24 ENCOUNTER — HOSPITAL ENCOUNTER (OUTPATIENT)
Dept: MRI IMAGING | Facility: HOSPITAL | Age: 48
Discharge: HOME | End: 2018-07-24
Admitting: NURSE PRACTITIONER

## 2018-07-24 DIAGNOSIS — H57.13 PAIN OF BOTH EYES: ICD-10-CM

## 2018-07-24 DIAGNOSIS — H53.9 VISION CHANGES: ICD-10-CM

## 2018-07-24 DIAGNOSIS — R51.9 NEW ONSET OF HEADACHES: ICD-10-CM

## 2018-07-24 LAB
TESTOST FREE MFR SERPL: 1.53 % (ref 0.5–2.8)
TESTOST FREE SERPL-MCNC: 0.25 NG/DL (ref 0.1–0.85)
TESTOST SERPL-MCNC: 16.2 NG/DL

## 2018-07-24 PROCEDURE — 70553 MRI BRAIN STEM W/O & W/DYE: CPT

## 2018-07-24 PROCEDURE — 25010000002 GADOTERIDOL PER 1 ML: Performed by: RADIOLOGY

## 2018-07-24 PROCEDURE — A9576 INJ PROHANCE MULTIPACK: HCPCS | Performed by: RADIOLOGY

## 2018-07-24 RX ADMIN — GADOTERIDOL 14 ML: 279.3 INJECTION, SOLUTION INTRAVENOUS at 15:26

## 2019-11-25 ENCOUNTER — OFFICE VISIT (OUTPATIENT)
Dept: FAMILY MEDICINE CLINIC | Facility: CLINIC | Age: 49
End: 2019-11-25

## 2019-11-25 ENCOUNTER — APPOINTMENT (OUTPATIENT)
Dept: LAB | Facility: HOSPITAL | Age: 49
End: 2019-11-25

## 2019-11-25 VITALS
WEIGHT: 144 LBS | BODY MASS INDEX: 28.27 KG/M2 | DIASTOLIC BLOOD PRESSURE: 80 MMHG | HEIGHT: 60 IN | SYSTOLIC BLOOD PRESSURE: 120 MMHG

## 2019-11-25 DIAGNOSIS — Z12.4 ENCOUNTER FOR PAPANICOLAOU SMEAR FOR CERVICAL CANCER SCREENING: ICD-10-CM

## 2019-11-25 DIAGNOSIS — R05.9 COUGH: ICD-10-CM

## 2019-11-25 DIAGNOSIS — Z12.31 ENCOUNTER FOR SCREENING MAMMOGRAM FOR MALIGNANT NEOPLASM OF BREAST: ICD-10-CM

## 2019-11-25 DIAGNOSIS — Z00.00 WELLNESS EXAMINATION: Primary | ICD-10-CM

## 2019-11-25 LAB
25(OH)D3 SERPL-MCNC: 30.3 NG/ML (ref 30–100)
ALBUMIN SERPL-MCNC: 4.4 G/DL (ref 3.5–5.2)
ALBUMIN/GLOB SERPL: 1.4 G/DL
ALP SERPL-CCNC: 47 U/L (ref 39–117)
ALT SERPL W P-5'-P-CCNC: 14 U/L (ref 1–33)
ANION GAP SERPL CALCULATED.3IONS-SCNC: 14.8 MMOL/L (ref 5–15)
AST SERPL-CCNC: 20 U/L (ref 1–32)
BASOPHILS # BLD AUTO: 0.03 10*3/MM3 (ref 0–0.2)
BASOPHILS NFR BLD AUTO: 0.5 % (ref 0–1.5)
BILIRUB SERPL-MCNC: 0.9 MG/DL (ref 0.2–1.2)
BUN BLD-MCNC: 12 MG/DL (ref 6–20)
BUN/CREAT SERPL: 12.9 (ref 7–25)
CALCIUM SPEC-SCNC: 9 MG/DL (ref 8.6–10.5)
CHLORIDE SERPL-SCNC: 107 MMOL/L (ref 98–107)
CHOLEST SERPL-MCNC: 166 MG/DL (ref 0–200)
CO2 SERPL-SCNC: 20.2 MMOL/L (ref 22–29)
CREAT BLD-MCNC: 0.93 MG/DL (ref 0.57–1)
DEPRECATED RDW RBC AUTO: 39.3 FL (ref 37–54)
EOSINOPHIL # BLD AUTO: 0.07 10*3/MM3 (ref 0–0.4)
EOSINOPHIL NFR BLD AUTO: 1.3 % (ref 0.3–6.2)
ERYTHROCYTE [DISTWIDTH] IN BLOOD BY AUTOMATED COUNT: 12.3 % (ref 12.3–15.4)
GFR SERPL CREATININE-BSD FRML MDRD: 65 ML/MIN/1.73
GLOBULIN UR ELPH-MCNC: 3.1 GM/DL
GLUCOSE BLD-MCNC: 89 MG/DL (ref 65–99)
HBA1C MFR BLD: 5.46 % (ref 4.8–5.6)
HCT VFR BLD AUTO: 39.9 % (ref 34–46.6)
HDLC SERPL-MCNC: 56 MG/DL (ref 40–60)
HGB BLD-MCNC: 14 G/DL (ref 12–15.9)
IMM GRANULOCYTES # BLD AUTO: 0.02 10*3/MM3 (ref 0–0.05)
IMM GRANULOCYTES NFR BLD AUTO: 0.4 % (ref 0–0.5)
IRON 24H UR-MRATE: 64 MCG/DL (ref 37–145)
LDLC SERPL CALC-MCNC: 96 MG/DL (ref 0–100)
LDLC/HDLC SERPL: 1.72 {RATIO}
LYMPHOCYTES # BLD AUTO: 1.88 10*3/MM3 (ref 0.7–3.1)
LYMPHOCYTES NFR BLD AUTO: 34.4 % (ref 19.6–45.3)
MAGNESIUM SERPL-MCNC: 2.2 MG/DL (ref 1.6–2.6)
MCH RBC QN AUTO: 30.8 PG (ref 26.6–33)
MCHC RBC AUTO-ENTMCNC: 35.1 G/DL (ref 31.5–35.7)
MCV RBC AUTO: 87.9 FL (ref 79–97)
MONOCYTES # BLD AUTO: 0.45 10*3/MM3 (ref 0.1–0.9)
MONOCYTES NFR BLD AUTO: 8.2 % (ref 5–12)
NEUTROPHILS # BLD AUTO: 3.02 10*3/MM3 (ref 1.7–7)
NEUTROPHILS NFR BLD AUTO: 55.2 % (ref 42.7–76)
NRBC BLD AUTO-RTO: 0 /100 WBC (ref 0–0.2)
PLATELET # BLD AUTO: 201 10*3/MM3 (ref 140–450)
PMV BLD AUTO: 11.5 FL (ref 6–12)
POTASSIUM BLD-SCNC: 3.8 MMOL/L (ref 3.5–5.2)
PROT SERPL-MCNC: 7.5 G/DL (ref 6–8.5)
RBC # BLD AUTO: 4.54 10*6/MM3 (ref 3.77–5.28)
SODIUM BLD-SCNC: 142 MMOL/L (ref 136–145)
TRIGL SERPL-MCNC: 69 MG/DL (ref 0–150)
TSH SERPL DL<=0.05 MIU/L-ACNC: 3.55 UIU/ML (ref 0.27–4.2)
VIT B12 BLD-MCNC: 679 PG/ML (ref 211–946)
VLDLC SERPL-MCNC: 13.8 MG/DL (ref 5–40)
WBC NRBC COR # BLD: 5.47 10*3/MM3 (ref 3.4–10.8)

## 2019-11-25 PROCEDURE — 82607 VITAMIN B-12: CPT | Performed by: NURSE PRACTITIONER

## 2019-11-25 PROCEDURE — 83540 ASSAY OF IRON: CPT | Performed by: NURSE PRACTITIONER

## 2019-11-25 PROCEDURE — 80053 COMPREHEN METABOLIC PANEL: CPT | Performed by: NURSE PRACTITIONER

## 2019-11-25 PROCEDURE — 85025 COMPLETE CBC W/AUTO DIFF WBC: CPT | Performed by: NURSE PRACTITIONER

## 2019-11-25 PROCEDURE — 82306 VITAMIN D 25 HYDROXY: CPT | Performed by: NURSE PRACTITIONER

## 2019-11-25 PROCEDURE — 83735 ASSAY OF MAGNESIUM: CPT | Performed by: NURSE PRACTITIONER

## 2019-11-25 PROCEDURE — 84443 ASSAY THYROID STIM HORMONE: CPT | Performed by: NURSE PRACTITIONER

## 2019-11-25 PROCEDURE — 83036 HEMOGLOBIN GLYCOSYLATED A1C: CPT | Performed by: NURSE PRACTITIONER

## 2019-11-25 PROCEDURE — 99396 PREV VISIT EST AGE 40-64: CPT | Performed by: NURSE PRACTITIONER

## 2019-11-25 PROCEDURE — 80061 LIPID PANEL: CPT | Performed by: NURSE PRACTITIONER

## 2019-11-25 PROCEDURE — 36415 COLL VENOUS BLD VENIPUNCTURE: CPT | Performed by: NURSE PRACTITIONER

## 2019-11-25 PROCEDURE — G0123 SCREEN CERV/VAG THIN LAYER: HCPCS | Performed by: NURSE PRACTITIONER

## 2019-11-25 PROCEDURE — 87624 HPV HI-RISK TYP POOLED RSLT: CPT | Performed by: NURSE PRACTITIONER

## 2019-11-25 RX ORDER — PAROXETINE 10 MG/1
10 TABLET, FILM COATED ORAL EVERY MORNING
Qty: 30 TABLET | Refills: 11 | Status: SHIPPED | OUTPATIENT
Start: 2019-11-25 | End: 2020-11-23

## 2019-11-25 NOTE — PROGRESS NOTES
Chief Complaint   Patient presents with   • Annual Exam     mamogram and papsmear     Subjective   Karena Kent is a 49 y.o. female.     General exam for health care needs -no major complaints       Gynecologic Exam   The patient's pertinent negatives include no genital itching, genital lesions, genital odor, genital rash, missed menses, pelvic pain, vaginal bleeding or vaginal discharge. The current episode started more than 1 year ago. The problem occurs constantly. The problem has been resolved. She is not pregnant. Pertinent negatives include no abdominal pain, anorexia, back pain, chills, constipation, diarrhea, discolored urine, dysuria, fever, flank pain, frequency, headaches, hematuria, joint pain, joint swelling, nausea, painful intercourse, rash, sore throat, urgency or vomiting. The treatment provided mild relief. She is sexually active. No, her partner does not have an STD. Her menstrual history has been regular. There is no history of an abdominal surgery, a  section, an ectopic pregnancy, endometriosis, a gynecological surgery, herpes simplex, menorrhagia, metrorrhagia, miscarriage, ovarian cysts, perineal abscess, PID, an STD, a terminated pregnancy or vaginosis.   Cough   This is a recurrent problem. The current episode started 1 to 4 weeks ago. The problem has been gradually worsening. The problem occurs every few minutes. The cough is productive of sputum. Associated symptoms include nasal congestion and postnasal drip. Pertinent negatives include no chest pain, chills, ear congestion, ear pain, eye redness, fever, headaches, heartburn, hemoptysis, myalgias, rash, rhinorrhea, sore throat, shortness of breath, sweats, weight loss or wheezing. There is no history of asthma, bronchiectasis, bronchitis, COPD, emphysema or environmental allergies.        The following portions of the patient's history were reviewed and updated as appropriate: allergies, current medications, past  "social history and problem list.    Review of Systems   Constitutional: Positive for fatigue. Negative for activity change, appetite change, chills, diaphoresis, fever, unexpected weight change and weight loss.   HENT: Positive for postnasal drip. Negative for ear pain, rhinorrhea and sore throat.    Eyes: Negative.  Negative for photophobia, pain, discharge, redness, itching and visual disturbance.   Respiratory: Positive for cough. Negative for apnea, hemoptysis, choking, chest tightness, shortness of breath, wheezing and stridor.    Cardiovascular: Negative.  Negative for chest pain, palpitations and leg swelling.   Gastrointestinal: Negative.  Negative for abdominal distention, abdominal pain, anal bleeding, anorexia, blood in stool, constipation, diarrhea, heartburn, nausea and vomiting.   Endocrine: Negative.  Negative for cold intolerance, heat intolerance, polydipsia, polyphagia and polyuria.   Genitourinary: Negative.  Negative for difficulty urinating, dyspareunia, dysuria, flank pain, frequency, hematuria, menorrhagia, missed menses, pelvic pain, urgency, vaginal bleeding and vaginal discharge.        Hot flashes but paxil helps    Musculoskeletal: Negative.  Negative for back pain, joint pain and myalgias.   Skin: Negative.  Negative for rash.   Allergic/Immunologic: Negative.  Negative for environmental allergies, food allergies and immunocompromised state.   Neurological: Negative.  Negative for dizziness, seizures, syncope, facial asymmetry, speech difficulty, light-headedness, numbness and headaches.   Hematological: Negative.  Negative for adenopathy. Does not bruise/bleed easily.   Psychiatric/Behavioral: Negative.    All other systems reviewed and are negative.      Objective   /80   Ht 152.4 cm (60\")   Wt 65.3 kg (144 lb)   BMI 28.12 kg/m²   Physical Exam   Constitutional: She is oriented to person, place, and time. She appears well-developed and well-nourished. No distress. She is not " intubated.   HENT:   Head: Normocephalic and atraumatic.   Right Ear: External ear normal.   Left Ear: External ear normal.   Mouth/Throat: No oropharyngeal exudate.   Eyes: EOM are normal. Pupils are equal, round, and reactive to light. Right eye exhibits no discharge. Left eye exhibits no discharge. No scleral icterus.   Neck: Normal range of motion. Neck supple. No JVD present. No tracheal deviation present. No thyromegaly present.   Cardiovascular: Normal rate, regular rhythm, normal heart sounds and intact distal pulses. Exam reveals no gallop and no friction rub.   No murmur heard.  Pulmonary/Chest: Effort normal and breath sounds normal. No accessory muscle usage or stridor. No apnea, no tachypnea and no bradypnea. She is not intubated. No respiratory distress. She has no wheezes. She has no rales. Chest wall is not dull to percussion. She exhibits no mass, no tenderness, no bony tenderness, no laceration, no crepitus, no edema, no deformity, no swelling and no retraction. Right breast exhibits no inverted nipple, no mass, no nipple discharge, no skin change and no tenderness. Left breast exhibits no inverted nipple, no mass, no nipple discharge, no skin change and no tenderness. Breasts are symmetrical. There is no breast swelling.   Implants bilaterally    Abdominal: Soft. Bowel sounds are normal. She exhibits no distension and no mass. There is no tenderness. There is no rebound and no guarding. No hernia. Hernia confirmed negative in the right inguinal area and confirmed negative in the left inguinal area.   Genitourinary: Vagina normal and uterus normal. Rectal exam shows no external hemorrhoid, no internal hemorrhoid, no fissure, no mass, no tenderness, anal tone normal and guaiac negative stool. No breast tenderness, discharge or bleeding. No labial fusion. There is no rash, tenderness, lesion or injury on the right labia. There is no rash, tenderness, lesion or injury on the left labia. Uterus is not  deviated, not enlarged, not fixed and not tender. Cervix exhibits no motion tenderness, no discharge and no friability. Right adnexum displays no mass, no tenderness and no fullness. Left adnexum displays no mass, no tenderness and no fullness. No erythema, tenderness or bleeding in the vagina. No foreign body in the vagina. No signs of injury around the vagina. No vaginal discharge found.   Musculoskeletal: Normal range of motion. She exhibits no edema, tenderness or deformity.   Lymphadenopathy:     She has no cervical adenopathy. No inguinal adenopathy noted on the right or left side.   Neurological: She is alert and oriented to person, place, and time. She has normal reflexes. She displays normal reflexes. No cranial nerve deficit or sensory deficit. She exhibits normal muscle tone. Coordination normal.   Skin: Skin is warm and dry. No rash noted. She is not diaphoretic. No erythema. No pallor.   Nursing note and vitals reviewed.      Assessment/Plan   Problem List Items Addressed This Visit        Respiratory    Cough    Relevant Orders    XR Chest PA & Lateral (Completed)       Other    Wellness examination - Primary         Status:  Final result   Visible to patient:  No (Not Released) Dx:  Cough   Details     Reading Physician Reading Date Result Priority   Tom Villalobos MD 11/25/2019       Narrative     PROCEDURE: Chest, two views.    INDICATION: cough-previous smoker, R05 Cough    COMPARISON: None.    FINDINGS:    Two views of the chest.    The heart, hilar and mediastinal structures normal. Pulmonary  vascularity normal.  Lungs clear.  No pleural effusion.    Bony structures unremarkable.      Impression     No acute disease.    42007          Electronically signed              New Medications Ordered This Visit   Medications   • PARoxetine (PAXIL) 10 MG tablet     Sig: Take 1 tablet by mouth Every Morning.     Dispense:  30 tablet     Refill:  11       It's not just what you eat, but when you  eat  Eat breakfast, and eat smaller meals throughout the day. A healthy breakfast can jumpstart your metabolism, while eating small, healthy meals (rather than the standard three large meals) keeps your energy up.   Avoid eating at night. Try to eat dinner earlier and fast for 14-16 hours until breakfast the next morning. Studies suggest that eating only when you’re most active and giving your digestive system a long break each day may help to regulate weight.     Labs today, refill paxil, chest xray today, meds as directed

## 2019-11-26 ENCOUNTER — TELEPHONE (OUTPATIENT)
Dept: FAMILY MEDICINE CLINIC | Facility: CLINIC | Age: 49
End: 2019-11-26

## 2019-11-26 NOTE — TELEPHONE ENCOUNTER
Per ONEIDA Garcia, Ms. Kent has been called with recent Lab and Screening Mammogram results & recommendations.  Continue current medications and follow-up as planned or sooner if any problems.      ----- Message from ONEIDA Davis sent at 11/26/2019 10:31 AM CST -----  Regarding: FW:  Can you let her know normal labs.  ----- Message -----  From: Lab, Background User  Sent: 11/25/2019   7:21 PM  To: ONEIDA Davis

## 2019-11-27 LAB
GEN CATEG CVX/VAG CYTO-IMP: NORMAL
LAB AP CASE REPORT: NORMAL
LAB AP GYN ADDITIONAL INFORMATION: NORMAL
LAB AP GYN OTHER FINDINGS: NORMAL
PATH INTERP SPEC-IMP: NORMAL
STAT OF ADQ CVX/VAG CYTO-IMP: NORMAL

## 2019-11-29 LAB — HPV I/H RISK 4 DNA CVX QL PROBE+SIG AMP: NEGATIVE

## 2020-05-13 ENCOUNTER — OFFICE VISIT (OUTPATIENT)
Dept: FAMILY MEDICINE CLINIC | Facility: CLINIC | Age: 50
End: 2020-05-13

## 2020-05-13 VITALS — HEIGHT: 60 IN | BODY MASS INDEX: 27.09 KG/M2 | WEIGHT: 138 LBS

## 2020-05-13 DIAGNOSIS — M25.512 ACUTE PAIN OF LEFT SHOULDER: Primary | ICD-10-CM

## 2020-05-13 DIAGNOSIS — M25.612 DECREASED ROM OF LEFT SHOULDER: ICD-10-CM

## 2020-05-13 PROCEDURE — 99214 OFFICE O/P EST MOD 30 MIN: CPT | Performed by: NURSE PRACTITIONER

## 2020-05-13 RX ORDER — METHYLPREDNISOLONE 4 MG/1
TABLET ORAL
Qty: 21 TABLET | Refills: 0 | Status: SHIPPED | OUTPATIENT
Start: 2020-05-13 | End: 2020-07-10

## 2020-05-13 RX ORDER — MELOXICAM 7.5 MG/1
7.5 TABLET ORAL DAILY
Qty: 30 TABLET | Refills: 11 | Status: SHIPPED | OUTPATIENT
Start: 2020-05-13 | End: 2021-04-29

## 2020-05-13 NOTE — PROGRESS NOTES
"  Chief Complaint   Patient presents with   • Pain     shoulder     Subjective   Karena Kent is a 50 y.o. female.     Shoulder Injury    The left shoulder is affected. The incident occurred more than 1 week ago. There was no injury mechanism. The quality of the pain is described as burning, shooting and stabbing. The pain radiates to the left hand and left arm. The pain is at a severity of 10/10. The pain is severe. Associated symptoms include muscle weakness, numbness and tingling. Pertinent negatives include no chest pain. The symptoms are aggravated by movement, overhead lifting and palpation. She has tried acetaminophen, NSAIDs, elevation, heat, ice, immobilization and non-weight bearing (home pt with no relief for the last 4-5 weeks and taking ibuprofen ) for the symptoms. The treatment provided no relief.        The following portions of the patient's history were reviewed and updated as appropriate: allergies, current medications, past social history and problem list.    Review of Systems   Eyes: Negative.    Cardiovascular: Negative for chest pain.   Endocrine: Negative.    Musculoskeletal: Positive for arthralgias, back pain, gait problem, joint swelling and myalgias. Negative for neck pain and neck stiffness.        Severe left shoulder pain with movement -decreased rom and movement    Neurological: Positive for tingling and numbness.   Hematological: Negative.    Psychiatric/Behavioral: Negative.  Negative for agitation and behavioral problems.       Objective   Ht 152.4 cm (60\")   Wt 62.6 kg (138 lb)   BMI 26.95 kg/m²   Physical Exam   Constitutional: She appears well-developed.   Cardiovascular: Normal rate.   Pulmonary/Chest: Effort normal.   Abdominal: Soft.   Musculoskeletal: She exhibits tenderness. She exhibits no edema or deformity.        Right shoulder: She exhibits decreased range of motion, tenderness, bony tenderness, pain, spasm and decreased strength. She exhibits no swelling, " no effusion, no crepitus, no deformity, no laceration and normal pulse.        Left shoulder: She exhibits tenderness, bony tenderness, pain and spasm.        Arms:  Neurological: She is alert.   Nursing note and vitals reviewed.      Assessment/Plan   Problem List Items Addressed This Visit        Nervous and Auditory    Acute pain of left shoulder - Primary    Relevant Orders    XR Shoulder 2+ View Left (Completed)    MRI Shoulder Left Without Contrast       Other    Decreased ROM of left shoulder    Relevant Orders    MRI Shoulder Left Without Contrast    Ambulatory Referral to Physical Therapy           New Medications Ordered This Visit   Medications   • methylPREDNISolone (MEDROL, JESUS,) 4 MG tablet     Sig: Take as directed on package instructions.     Dispense:  21 tablet     Refill:  0   • meloxicam (Mobic) 7.5 MG tablet     Sig: Take 1 tablet by mouth Daily.     Dispense:  30 tablet     Refill:  11      meds as directed   Answers for HPI/ROS submitted by the patient on 5/11/2020   What is the primary reason for your visit?: Other  Please describe your symptoms.: Left shoulder pain, worse with ROM and better with rest.  Have you had these symptoms before?: Yes  How long have you been having these symptoms?: 1-4 weeks ago  Please list any medications you are currently taking for this condition.: I do not take anything on a scheduled basis.  I will occasionally take 400 mg of ibuprofen and/or use a heat pack.  Please describe any probable cause for these symptoms. : This began in the fall of last year.  In fact, symptoms were present at the time of my annual physical this past November, but it was a fairly new onset at that time, and I attributed the discomfort to a strain caused by picking up and carrying two children that I spent time with on a daily basis, and while I still believe that to likely be the cause, I assumed that it would improve in a short time, but it has not, and I am concerned that more  damage was caused than a simple muscular strain.    Patient has tired and failed on home pt, nsaids and heat and ice with no relief-concern for labrum tear to left shoulder -xray clear, mobic and medrol dose pack as directed, will order mri based on outpatient treStatus:  Final result   Visible to patient:  No (Not Released) Dx:  Acute pain of left shoulder   Details     Reading Physician Reading Date Result Priority   Kimberly Gilbert MD 5/13/2020       Narrative & Impression        Three view left shoulder     HISTORY: Left shoulder pain     AP films with the humerus in internal and external rotation and  scapular Y view were obtained.     COMPARISON: None     FINDINGS:   No fracture or dislocation.  No other osseous or articular abnormality.     IMPRESSION:  CONCLUSION:  Normal left shoulder     [id number]     Electronically signed by:  Anthony Gilbert MD  5/13/2020 4:47 PM CDT  Workstation: [id number]              atment failure   PT-referral as directed

## 2020-05-20 ENCOUNTER — APPOINTMENT (OUTPATIENT)
Dept: MRI IMAGING | Facility: HOSPITAL | Age: 50
End: 2020-05-20

## 2020-05-20 PROBLEM — M25.612 DECREASED ROM OF LEFT SHOULDER: Status: ACTIVE | Noted: 2020-05-20

## 2020-06-04 ENCOUNTER — HOSPITAL ENCOUNTER (OUTPATIENT)
Dept: PHYSICAL THERAPY | Facility: HOSPITAL | Age: 50
Setting detail: THERAPIES SERIES
Discharge: HOME | End: 2020-06-04

## 2020-06-04 DIAGNOSIS — M25.512 ACUTE PAIN OF LEFT SHOULDER: ICD-10-CM

## 2020-06-04 DIAGNOSIS — M25.612 DECREASED ROM OF LEFT SHOULDER: Primary | ICD-10-CM

## 2020-06-04 PROCEDURE — 97161 PT EVAL LOW COMPLEX 20 MIN: CPT | Performed by: PHYSICAL THERAPIST

## 2020-06-05 NOTE — THERAPY EVALUATION
Outpatient Physical Therapy Ortho Initial Evaluation  [facility]     Patient Name: Karena Kent  : 1970  MRN: [id number]  Today's Date: 2020      Visit Date: 2020  Attendance:  (60/yr)  Subjective Improvement: n/a  Next MD Appt: none scheduled  Recert Date: 2020    Therapy Diagnosis: L shoulder pain/posterior impingement         Past Medical History:   Diagnosis Date   • Encounter for general adult medical examination without abnormal findings     wellness exam      • Hand pain    • Hip pain    • Normal gynecologic examination    • Rib pain    • Shoulder joint pain         Past Surgical History:   Procedure Laterality Date   • AUGMENTATION MAMMAPLASTY     • BREAST IMPLANT SURGERY     • HYSTEROSCOPY      [facility], hysterscopy, cautery of cervical ectropion secondary to menorrhagia and postcoital bleeding       Current Outpatient Medications:   •  Biotin 2500 MCG capsule, Take  by mouth., Disp: , Rfl:   •  meloxicam (Mobic) 7.5 MG tablet, Take 1 tablet by mouth Daily., Disp: 30 tablet, Rfl: 11  •  methylPREDNISolone (MEDROL, JESUS,) 4 MG tablet, Take as directed on package instructions., Disp: 21 tablet, Rfl: 0  •  Multiple Vitamins-Minerals (MULTIVITAMIN ADULT PO), Take  by mouth., Disp: , Rfl:   •  PARoxetine (PAXIL) 10 MG tablet, Take 1 tablet by mouth Every Morning., Disp: 30 tablet, Rfl: 11    No Known Allergies    Visit Dx:     ICD-10-CM ICD-9-CM   1. Decreased ROM of left shoulder M25.612 719.51   2. Acute pain of left shoulder M25.512 719.41         Patient History     Row Name 20 1600             History    Chief Complaint  Joint stiffness;Pain  -SS      Type of Pain  Shoulder pain left  -SS      Date Current Problem(s) Began  -- 2019  -SS      Brief Description of Current Complaint  Onset  of unknown cause. She initially attributed it to picking up 2 children on a routine basis. Now notices some discomfort all the time. Increased  pain when reaches into extension or horizontal abduction. Pain reduces to her baseline if she gets out of the painful position. Anti-inflammatories or heat help decrease her pain.  female with children.   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 Hobbies: read, treadmill, ride bicycles, arts & crafts, travel  -SS      What clinical tests have you had for this problem?  X-ray  -SS      Results of Clinical Tests  normal x-ray of L shoulder  -SS         Pain     Pain Location  Shoulder left  -SS      Pain at Present  2  -SS      Pain at Best  2 over past 30 days  -SS      Pain at Worst  10 over past 30 days  -SS      Pain Frequency  Constant/continuous  -SS      Pain Description  Numbness;Tingling sharp, stabbing at worst  -SS      What Performance Factors Make the Current Problem(s) WORSE?  reaching back into extension or horizontal abduction  -SS      What Performance Factors Make the Current Problem(s) BETTER?  getting out of painful position, heat  -SS      Is your sleep disturbed?  -- occasional  -SS      Is medication used to assist with sleep?  No  -SS      Difficulties at work?  no  -SS      Difficulties with ADL's?  pain  -SS      Difficulties with recreational activities?  pain  -SS         Fall Risk Assessment    Any falls in the past year:  No  -SS      Does patient have a fear of falling  No  -SS         Daily Activities    Primary Language  English  -SS         Safety    Are you being hurt, hit, or frightened by anyone at home or in your life?  No  -SS      Are you being neglected by a caregiver  No  -SS        User Key  (r) = Recorded By, (t) = Taken By, (c) = Cosigned By    Initials Name Provider Type     "SS Anthony Lorenzo, PT DPT Physical Therapist          PT Ortho     Row Name 06/04/20 1600       Subjective Comments    Subjective Comments  see Therapy Patient History  -SS       Precautions and Contraindications    Precautions/Limitations  no known precautions/limitations  -SS       Subjective Pain    Able to rate subjective pain?  yes  -SS    Pre-Treatment Pain Level  2  -SS    Post-Treatment Pain Level  -- 1-2/10  -SS       Posture/Observations    Posture/Observations Comments  Mild forward head, rounded shoulder posture. Mild B scapular winging.   -SS       Shoulder Impingement/Rotator Cuff Special Tests    Miranda-Rohith Test (RC Lesion vs. Bursitis)  Left:;Positive  -SS    Neer Impingement Test (RC Lesion vs. Bursitis)  Left:;Negative  -SS    Full Can Test (RC Lesion)  Left:;Negative  -SS    Empty Can Test (RC Lesion)  Left:;Positive \"a little bit\" of pain  -SS    Drop Arm Test (Full Thickness RC Lesion)  Left:;Negative  -SS    Speed's Test (LH of Biceps Lesion)  Left: posterior shoulder pain  -SS    Shoulder Impingement/Rotator Cuff Special Tests Comments  TTP posterior delt, posterior shoulder, and teres minor muscle belly. Sore with palpation of lat approaching axilla. Non-tender to palpation anterior, lateral, and superior shoulder, SS and IS muscle bellies.   -SS       General ROM    LT Upper Ext  Lt Shoulder ABduction;Lt Shoulder Extension;Lt Shoulder Flexion;Lt Shoulder External Rotation;Lt Shoulder Internal Rotation  -SS       Right Upper Ext    Rt Shoulder Abduction AROM  162 deg  -SS    Rt Shoulder Extension AROM  68 deg  -SS    Rt Shoulder Flexion AROM  162 deg  -SS    Rt Shoulder External Rotation AROM  92 deg  -SS    Rt Shoulder Internal Rotation AROM  55 deg  -SS    Rt Upper Extremity Comments   IR & ER measured in supine 90/90 position  -SS       Left Upper Ext    Lt Shoulder Abduction AROM  153 deg  -SS    Lt Shoulder Abduction PROM  180 deg  -SS    Lt Shoulder Extension AROM  45 deg  " -SS    Lt Shoulder Flexion AROM  155 deg  -SS    Lt Shoulder Flexion PROM  165 deg  -SS    Lt Shoulder External Rotation AROM  85 deg pain  -SS    Lt Shoulder External Rotation PROM  70 deg; pain  -SS    Lt Shoulder Internal Rotation AROM  65 deg  -SS    Lt Shoulder Internal Rotation PROM  70 deg  -SS    Lt Upper Extremity Comments   IR & ER measured in supine 90/90 position  -       MMT (Manual Muscle Testing)    Lt Upper Ext  Lt Shoulder Flexion;Lt Shoulder Extension;Lt Shoulder ABduction;Lt Shoulder Internal Rotation;Lt Shoulder External Rotation  -       MMT Left Upper Ext    Lt Shoulder Flexion MMT, Gross Movement  (5/5) normal posterior shoulder pain  -SS    Lt Shoulder Extension MMT, Gross Movement  (5/5) normal  -SS    Lt Shoulder ABduction MMT, Gross Movement  (5/5) normal  -SS    Lt Shoulder Internal Rotation MMT, Gross Movement  (5/5) normal posterior shoulder pain neutral and abducted 90 deg  -SS    Lt Shoulder External Rotation MMT, Gross Movement  (5/5) normal posterior shoulder pain neutral but not abducted 90 deg  -      User Key  (r) = Recorded By, (t) = Taken By, (c) = Cosigned By    Initials Name Provider Type     Anthony Lorenzo, PT DPT Physical Therapist          Therapy Education  Education Details: posterior shoulder stretch, scap squeezes  Given: HEP  Program: New  How Provided: Verbal, Demonstration, Written  Provided to: Patient  Level of Understanding: Verbalized, Demonstrated     PT OP Goals     Row Name 06/04/20 1600          PT Short Term Goals    STG Date to Achieve  -- STGs deferred  -SS        Long Term Goals    LTG Date to Achieve  07/02/20  -     LTG 1  Independent with HEP/self-management.  -     LTG 2  L shoulder AROM WNLs and painfree.  -     LTG 3  Minimal to no pain L shoulder.  -        Time Calculation    PT Goal Re-Cert Due Date  06/25/20  -       User Key  (r) = Recorded By, (t) = Taken By, (c) = Cosigned By    Initials Name Provider Type      Anthony Lorenzo, PT DPT Physical Therapist          PT Assessment/Plan     Row Name 06/04/20 1600          PT Assessment    Functional Limitations  Limitations in functional capacity and performance  -SS     Impairments  Pain;Range of motion;Joint mobility  -SS     Assessment Comments  Patient has posterior shoulder pain with possible posterior impingement.   -SS     Rehab Potential  Good  -SS     Patient/caregiver participated in establishment of treatment plan and goals  Yes  -SS     Patient would benefit from skilled therapy intervention  Yes  -SS        PT Plan    PT Frequency  2x/week  -SS     Predicted Duration of Therapy Intervention (Therapy Eval)  4 weeks with further to be determined  -SS     PT Plan Comments  US to the posterior L shoulder, stretching, shoulder and scapular muscle strengthening, MFR, glenohumeral joint mobilization, possible thoracic joint mobilization, ice as needed for pain  -SS       User Key  (r) = Recorded By, (t) = Taken By, (c) = Cosigned By    Initials Name Provider Type    SS Anthony Lorenzo, PT DPT Physical Therapist          Outcome Measure Options: Quick DASH  Quick DASH  Open a tight or new jar.: No Difficulty  Do heavy household chores (e.g., wash walls, wash floors): No Difficulty  Carry a shopping bag or briefcase: No Difficulty  Wash your back: Moderate Difficulty  Use a knife to cut food: No Difficulty  Recreational activities in which you take some force or impact through your arm, should or hand (e.g. golf, hammering, tennis, etc.): No Difficulty  During the past week, to what extent has your arm, shoulder, or hand problem interfered with your normal social activities with family, friends, neighbors or groups?: Not at all  During the past week, were you limited in your work or other regular daily activities as a result of your arm, shoulder or hand problem?: Slightly Limited  Arm, Shoulder, or hand pain: Moderate  Tingling (pins and needles) in your arm,  shoulder, or hand: Moderate  During the past week, how much difficulty have you had sleeping because of the pain in your arm, shoulder or hand?: Mild Difficulty  Number of Questions Answered: 11  Quick DASH Score: 18.18         Time Calculation:     Start Time: 1600  Stop Time: 1644  Time Calculation (min): 44 min     Therapy Charges for Today     Code Description Service Date Service Provider Modifiers Qty    69198013600 HC PT EVAL LOW COMPLEXITY 3 6/4/2020 Anthony Lorenzo, PT DPT GP 1                   Anthony Lorenzo, PT, DPT, CHT  6/4/2020

## 2020-06-08 ENCOUNTER — HOSPITAL ENCOUNTER (OUTPATIENT)
Dept: PHYSICAL THERAPY | Facility: HOSPITAL | Age: 50
Setting detail: THERAPIES SERIES
Discharge: HOME | End: 2020-06-08

## 2020-06-08 DIAGNOSIS — M25.512 ACUTE PAIN OF LEFT SHOULDER: ICD-10-CM

## 2020-06-08 DIAGNOSIS — M25.612 DECREASED ROM OF LEFT SHOULDER: Primary | ICD-10-CM

## 2020-06-08 PROCEDURE — 97140 MANUAL THERAPY 1/> REGIONS: CPT

## 2020-06-08 PROCEDURE — 97110 THERAPEUTIC EXERCISES: CPT

## 2020-06-08 PROCEDURE — 97035 APP MDLTY 1+ULTRASOUND EA 15: CPT

## 2020-06-08 NOTE — THERAPY TREATMENT NOTE
"    Outpatient Physical Therapy Ortho Treatment Note  [facility]     Patient Name: Karena Kent  : 1970  MRN: [id number]  Today's Date: 2020      Visit Date: 2020  Subjective Improvement: \"I can't tell\"  MD visit: TBD  Visit Number:   Total Approved:60 a year  Recert Date: 2020  Visit Dx:    ICD-10-CM ICD-9-CM   1. Decreased ROM of left shoulder M25.612 719.51   2. Acute pain of left shoulder M25.512 719.41       Patient Active Problem List   Diagnosis   • Acute idiopathic gout of left foot   • Malaise   • Wellness examination   • Pain of both eyes   • New onset headache   • Vision changes   • Hot flashes   • Cough   • Acute pain of left shoulder   • Decreased ROM of left shoulder        Past Medical History:   Diagnosis Date   • Encounter for general adult medical examination without abnormal findings     wellness exam      • Hand pain    • Hip pain    • Normal gynecologic examination    • Rib pain    • Shoulder joint pain         Past Surgical History:   Procedure Laterality Date   • AUGMENTATION MAMMAPLASTY     • BREAST IMPLANT SURGERY     • HYSTEROSCOPY  2008    [facility], hysterscopy, cautery of cervical ectropion secondary to menorrhagia and postcoital bleeding       PT Ortho     Row Name 20 1600       Precautions and Contraindications    Precautions/Limitations  no known precautions/limitations  -TL       Subjective Pain    Able to rate subjective pain?  yes  -TL    Pre-Treatment Pain Level  0  -TL    Post-Treatment Pain Level  2 ice to go  -TL       Posture/Observations    Posture/Observations Comments  Mild forward head, rounded shoulder posture. Mild B scapular winging.   -TL      User Key  (r) = Recorded By, (t) = Taken By, (c) = Cosigned By    Initials Name Provider Type    TL Johnna Gimenez PTA Physical Therapy Assistant                      PT Assessment/Plan     Row Name 20 1600          PT Assessment    Assessment Comments  Pt tolerated " pro ll fwd/back for 10mins total. Pt came in without pain. Pt reports that she still has pain with certain movements. PTA add no money to HEP. Pt tolerated manual therapy well.No goals met at this time  -TL        PT Plan    PT Frequency  2x/week  -TL     Predicted Duration of Therapy Intervention (Therapy Eval)  4 weeks  -TL     PT Plan Comments  add shld rows next visit if pt continues to do well  -TL       User Key  (r) = Recorded By, (t) = Taken By, (c) = Cosigned By    Initials Name Provider Type    Johnna Yusuf PTA Physical Therapy Assistant          Modalities     Row Name 06/08/20 1600             Subjective Comments    Subjective Comments  Pt has been doing her stretches and squeezes. Pt came in the clinic without pain.  -TL         Ice    Patient reports will apply ice at home to involved area  Yes ice to go  -TL         Ultrasound 47600    Location  posterior left shld  -TL      Duty Cycle  100  -TL      Frequency  1.0 MHz  -TL      Intensity - Wts/cm  1.5  -TL      36155 - PT Ultrasound Minutes  8  -TL        User Key  (r) = Recorded By, (t) = Taken By, (c) = Cosigned By    Initials Name Provider Type    Johnna Yusuf PTA Physical Therapy Assistant        OP Exercises     Row Name 06/08/20 1600             Subjective Comments    Subjective Comments  Pt has been doing her stretches and squeezes. Pt came in the clinic without pain.  -TL         Subjective Pain    Able to rate subjective pain?  yes  -TL      Pre-Treatment Pain Level  0  -TL      Post-Treatment Pain Level  2 ice to go  -TL         Exercise 1    Exercise Name 1  See Modalities  -TL      Additional Comments  US  -TL         Exercise 2    Exercise Name 2  posterior shld s  -TL      Reps 2  2  -TL      Time 2  30 sec hold  -TL         Exercise 3    Exercise Name 3  shld squeezes  -TL      Reps 3  20  -TL      Time 3  5 sec hold  -TL         Exercise 4    Exercise Name 4  no money squeezes  -TL      Sets 4  2  -TL      Reps 4  10   -TL      Time 4  5 sec hold  -TL         Exercise 5    Exercise Name 5  upper trap S left  -TL      Reps 5  2  -TL      Time 5  30 sec hold  -TL         Exercise 6    Exercise Name 6  pro ll Fwd/back UE  -TL      Time 6  10min  -TL      Additional Comments  level 1  -TL        User Key  (r) = Recorded By, (t) = Taken By, (c) = Cosigned By    Initials Name Provider Type    Johnna Yusuf PTA Physical Therapy Assistant                      Manual Rx (last 36 hours)      Manual Treatments     Row Name 06/08/20 1500             Manual Rx 1    Manual Rx 1 Location  left shld shoveling fwd/back /oscillations  -TL      Manual Rx 1 Duration  8 mins  -TL        User Key  (r) = Recorded By, (t) = Taken By, (c) = Cosigned By    Initials Name Provider Type    Johnna Yusuf PTA Physical Therapy Assistant          PT OP Goals     Row Name 06/08/20 1600          PT Short Term Goals    STG Date to Achieve  -- STGs deferred  -TL        Long Term Goals    LTG Date to Achieve  07/02/20  -TL     LTG 1  Independent with HEP/self-management.  -TL     LTG 1 Progress  Ongoing;Progressing  -TL     LTG 2  L shoulder AROM WNLs and painfree.  -TL     LTG 2 Progress  Ongoing  -TL     LTG 3  Minimal to no pain L shoulder.  -TL     LTG 3 Progress  Ongoing  -TL        Time Calculation    PT Goal Re-Cert Due Date  06/25/20  -TL       User Key  (r) = Recorded By, (t) = Taken By, (c) = Cosigned By    Initials Name Provider Type    Johnna Yusuf PTA Physical Therapy Assistant          Therapy Education  Education Details: no money ex  Given: HEP, Symptoms/condition management, Pain management, Posture/body mechanics  Program: New  How Provided: Verbal, Demonstration, Written  Provided to: Patient  Level of Understanding: Verbalized, Demonstrated              Time Calculation:   Start Time: 1604  Stop Time: 1644  Time Calculation (min): 40 min  PT Non-Billable Time (min): 32 min  Total Timed Code Minutes- PT: 40 minute(s)  Therapy  Charges for Today     Code Description Service Date Service Provider Modifiers Qty    17712274602 HC PT ULTRASOUND EA 15 MIN 6/8/2020 Johnna Gimenez, PTA GP 1    92377093795 HC PT MANUAL THERAPY EA 15 MIN 6/8/2020 Johnna Gimenez, PTA GP 1    36102363038 HC PT THER PROC EA 15 MIN 6/8/2020 Johnna Gimenez, PTA GP 1                    Johnna Gimenez PTA  6/8/2020

## 2020-06-10 ENCOUNTER — HOSPITAL ENCOUNTER (OUTPATIENT)
Dept: PHYSICAL THERAPY | Facility: HOSPITAL | Age: 50
Setting detail: THERAPIES SERIES
Discharge: HOME | End: 2020-06-10

## 2020-06-10 DIAGNOSIS — M25.612 DECREASED ROM OF LEFT SHOULDER: Primary | ICD-10-CM

## 2020-06-10 DIAGNOSIS — M25.512 ACUTE PAIN OF LEFT SHOULDER: ICD-10-CM

## 2020-06-10 PROCEDURE — 97140 MANUAL THERAPY 1/> REGIONS: CPT | Performed by: PHYSICAL THERAPIST

## 2020-06-10 PROCEDURE — 97110 THERAPEUTIC EXERCISES: CPT | Performed by: PHYSICAL THERAPIST

## 2020-06-10 NOTE — THERAPY TREATMENT NOTE
Outpatient Physical Therapy Ortho Treatment Note  [facility]     Patient Name: Karena Kent  : 1970  MRN: [id number]  Today's Date: 6/10/2020      Visit Date: 06/10/2020  Subjective Improvement: hurts less  MD visit: none scheduled  Visit Number: 3/3  Total Approved:60 a year  Recert Date: 2020  Visit Dx:    ICD-10-CM ICD-9-CM   1. Decreased ROM of left shoulder M25.612 719.51   2. Acute pain of left shoulder M25.512 719.41       Patient Active Problem List   Diagnosis   • Acute idiopathic gout of left foot   • Malaise   • Wellness examination   • Pain of both eyes   • New onset headache   • Vision changes   • Hot flashes   • Cough   • Acute pain of left shoulder   • Decreased ROM of left shoulder        Past Medical History:   Diagnosis Date   • Encounter for general adult medical examination without abnormal findings     wellness exam      • Hand pain    • Hip pain    • Normal gynecologic examination    • Rib pain    • Shoulder joint pain         Past Surgical History:   Procedure Laterality Date   • AUGMENTATION MAMMAPLASTY     • BREAST IMPLANT SURGERY     • HYSTEROSCOPY      LUDA Castro&MAAME, hysterscopy, cautery of cervical ectropion secondary to menorrhagia and postcoital bleeding       PT Ortho     Row Name 06/10/20 1600       Subjective Comments    Subjective Comments  Patient reports left shoulder is hurting less than it was, but she still has pain with abduction and ER.   -SW       Subjective Pain    Able to rate subjective pain?  yes  -SW    Pre-Treatment Pain Level  0  -SW    Post-Treatment Pain Level  5  -SW    Subjective Pain Comment  Patient feels increased pain may be more related to muscle weakness than actual pain.   -SW      User Key  (r) = Recorded By, (t) = Taken By, (c) = Cosigned By    Initials Name Provider Type    Manjula Alvarez Physical Therapist                      PT Assessment/Plan     Row Name 06/10/20 1600          PT Assessment    Assessment  Comments  Patient had pain with combined horizontal abuction and ER actively and passively.   -SW        PT Plan    PT Frequency  2x/week  -SW     Predicted Duration of Therapy Intervention (Therapy Eval)  weeks  -SW     PT Plan Comments  Continue with focus on scapular stabilization and strengthening.   -SW       User Key  (r) = Recorded By, (t) = Taken By, (c) = Cosigned By    Initials Name Provider Type    Manjula Alvarez Physical Therapist            OP Exercises     Row Name 06/10/20 1600             Subjective Comments    Subjective Comments  Patient reports left shoulder is hurting less than it was, but she still has pain with abduction and ER.   -SW         Subjective Pain    Able to rate subjective pain?  yes  -SW      Pre-Treatment Pain Level  0  -SW      Post-Treatment Pain Level  5  -SW      Subjective Pain Comment  Patient feels increased pain may be more related to muscle weakness than actual pain.   -SW         Exercise 1    Exercise Name 1  Pro II L3  -SW      Time 1  5'F/5'B  -SW         Exercise 2    Exercise Name 2  see manual  -SW         Exercise 3    Exercise Name 3  punches 3#  -SW      Reps 3  20  -SW         Exercise 4    Exercise Name 4  Er sidelying 3#  -SW      Reps 4  20  -SW         Exercise 5    Exercise Name 5  ER/IR red tb  -SW      Reps 5  20  -SW        User Key  (r) = Recorded By, (t) = Taken By, (c) = Cosigned By    Initials Name Provider Type    Manjula Alvarez Physical Therapist                      Manual Rx (last 36 hours)      Manual Treatments     Row Name 06/10/20 1500             Manual Rx 1    Manual Rx 1 Location  left shoulder  -SW      Manual Rx 1 Type  mobs/PROM/median and ulnar nerve glides/upper trap stretch  -SW      Manual Rx 1 Grade  1-2  -SW      Manual Rx 1 Duration  25'  -SW        User Key  (r) = Recorded By, (t) = Taken By, (c) = Cosigned By    Initials Name Provider Type    Manjula Alvarez Physical Therapist          PT OP Goals     Row Name 06/10/20  1600          PT Short Term Goals    STG Date to Achieve  -- STGs deferred  -        Long Term Goals    LTG Date to Achieve  07/02/20  -     LTG 1  Independent with HEP/self-management.  -     LTG 1 Progress  Ongoing;Progressing  -     LTG 2  L shoulder AROM WNLs and painfree.  -     LTG 2 Progress  Ongoing  -     LTG 3  Minimal to no pain L shoulder.  -     LTG 3 Progress  Ongoing  -        Time Calculation    PT Goal Re-Cert Due Date  06/25/20  -       User Key  (r) = Recorded By, (t) = Taken By, (c) = Cosigned By    Initials Name Provider Type    Manjula Alvarez Physical Therapist                         Time Calculation:   Start Time: 1605  Stop Time: 1645  Time Calculation (min): 40 min  Therapy Charges for Today     Code Description Service Date Service Provider Modifiers Qty    01392826361 HC PT THER PROC EA 15 MIN 6/10/2020 Manjula Batista GP 2    15715021855 HC PT MANUAL THERAPY EA 15 MIN 6/10/2020 Manjula Batista GP 1                    Manjula Batista  6/10/2020

## 2020-06-15 ENCOUNTER — HOSPITAL ENCOUNTER (OUTPATIENT)
Dept: PHYSICAL THERAPY | Facility: HOSPITAL | Age: 50
Setting detail: THERAPIES SERIES
Discharge: HOME | End: 2020-06-15

## 2020-06-15 DIAGNOSIS — M25.512 ACUTE PAIN OF LEFT SHOULDER: ICD-10-CM

## 2020-06-15 DIAGNOSIS — M25.612 DECREASED ROM OF LEFT SHOULDER: Primary | ICD-10-CM

## 2020-06-15 PROCEDURE — 97140 MANUAL THERAPY 1/> REGIONS: CPT

## 2020-06-15 PROCEDURE — 97110 THERAPEUTIC EXERCISES: CPT

## 2020-06-15 PROCEDURE — 97035 APP MDLTY 1+ULTRASOUND EA 15: CPT

## 2020-06-15 NOTE — THERAPY TREATMENT NOTE
Outpatient Physical Therapy Ortho Treatment Note  [facility]     Patient Name: Karena Kent  : 1970  MRN: [id number]  Today's Date: 6/15/2020      Visit Date: 06/15/2020  Subjective Improvement: 75%  MD visit: none scheduled  Visit Number:   Total Approved:60 a year.  Recert Date: 2020  Visit Dx:    ICD-10-CM ICD-9-CM   1. Decreased ROM of left shoulder M25.612 719.51   2. Acute pain of left shoulder M25.512 719.41       Patient Active Problem List   Diagnosis   • Acute idiopathic gout of left foot   • Malaise   • Wellness examination   • Pain of both eyes   • New onset headache   • Vision changes   • Hot flashes   • Cough   • Acute pain of left shoulder   • Decreased ROM of left shoulder        Past Medical History:   Diagnosis Date   • Encounter for general adult medical examination without abnormal findings     wellness exam      • Hand pain    • Hip pain    • Normal gynecologic examination    • Rib pain    • Shoulder joint pain         Past Surgical History:   Procedure Laterality Date   • AUGMENTATION MAMMAPLASTY     • BREAST IMPLANT SURGERY     • HYSTEROSCOPY      LUDA Castro&MAAME, hysterscopy, cautery of cervical ectropion secondary to menorrhagia and postcoital bleeding       PT Ortho     Row Name 06/15/20 1600       Subjective Comments    Subjective Comments  Pt reports 75% improved . pt reports when putting on seat belt before driving.  -TL       Precautions and Contraindications    Precautions/Limitations  no known precautions/limitations  -TL      User Key  (r) = Recorded By, (t) = Taken By, (c) = Cosigned By    Initials Name Provider Type    TL Johnna Gimenez PTA Physical Therapy Assistant                      PT Assessment/Plan     Row Name 06/15/20 1600          PT Assessment    Assessment Comments  Pt reports 75% improvement. pt able to tolerate resist ex this date with bands. PTA added YTB to no money ex and shld rows with RTB. Pt verbalized and demonstrated  understanding of each instructions. Pt met short term goal #3.  -TL        PT Plan    PT Frequency  2x/week  -TL     Predicted Duration of Therapy Intervention (Therapy Eval)  4 week  -TL     PT Plan Comments  add IR/ER RTB next vist.  -TL       User Key  (r) = Recorded By, (t) = Taken By, (c) = Cosigned By    Initials Name Provider Type    Johnna Yusuf PTA Physical Therapy Assistant          Modalities     Row Name 06/15/20 1600             Ultrasound 01200    Location  posterior left shld  -TL      Duty Cycle  100  -TL      Frequency  1.0 MHz  -TL      Intensity - Wts/cm  1.5  -TL      11713 - PT Ultrasound Minutes  8  -TL        User Key  (r) = Recorded By, (t) = Taken By, (c) = Cosigned By    Initials Name Provider Type    Johnna Yusuf PTA Physical Therapy Assistant        OP Exercises     Row Name 06/15/20 1600             Subjective Comments    Subjective Comments  Pt reports 75% improved . pt reports when putting on seat belt before driving.  -TL         Subjective Pain    Able to rate subjective pain?  yes  -TL      Pre-Treatment Pain Level  5  -TL         Exercise 1    Exercise Name 1  pro ll leve 4  -TL      Time 1  5fwd/5 back  -TL         Exercise 2    Exercise Name 2  US   -TL         Exercise 3    Exercise Name 3  No money with YTB  -TL      Reps 3  20  -TL      Time 3  5 sec hold  -TL         Exercise 4    Exercise Name 4  Shld rows mid RTB  -TL      Reps 4  20  -TL      Time 4  5 sec hold  -TL         Exercise 5    Exercise Name 5  shld row high  -TL      Reps 5  20  -TL      Time 5  5 sec hold  -TL        User Key  (r) = Recorded By, (t) = Taken By, (c) = Cosigned By    Initials Name Provider Type    Johnna Yusuf PTA Physical Therapy Assistant                      Manual Rx (last 36 hours)      Manual Treatments     Row Name 06/15/20 1500             Manual Rx 1    Manual Rx 1 Location  left scapula glides  -TL      Manual Rx 1 Duration  8 mins  -TL        User Key  (r) =  Recorded By, (t) = Taken By, (c) = Cosigned By    Initials Name Provider Type    Johnna Yusuf PTA Physical Therapy Assistant          PT OP Goals     Row Name 06/15/20 1600          PT Short Term Goals    STG Date to Achieve  -- STGs deferred  -TL        Long Term Goals    LTG Date to Achieve  07/02/20  -TL     LTG 1  Independent with HEP/self-management.  -TL     LTG 1 Progress  Ongoing;Progressing  -TL     LTG 2  L shoulder AROM WNLs and painfree.  -TL     LTG 2 Progress  Ongoing  -TL     LTG 3  Minimal to no pain L shoulder.  -TL     LTG 3 Progress  Met  -TL       User Key  (r) = Recorded By, (t) = Taken By, (c) = Cosigned By    Initials Name Provider Type    Johnna Yusuf PTA Physical Therapy Assistant          Therapy Education  Education Details: no money with YTB, shld rows mid/high,chest press YTB  Given: HEP, Symptoms/condition management, Pain management, Posture/body mechanics  Program: New, Reinforced  How Provided: Verbal, Demonstration, Written  Provided to: Patient  Level of Understanding: Teach back education performed, Verbalized, Demonstrated              Time Calculation:   Start Time: 1600  Stop Time: 1645  Time Calculation (min): 45 min  Therapy Charges for Today     Code Description Service Date Service Provider Modifiers Qty    04158941309 HC PT ULTRASOUND EA 15 MIN 6/15/2020 Johnna Gimenez PTA GP 1    51985872441 HC PT THER PROC EA 15 MIN 6/15/2020 Johnna Gimenez PTA GP 1    61662433233 HC PT MANUAL THERAPY EA 15 MIN 6/15/2020 Johnna Gimenez PTA GP 1                    Johnna Gimenez PTA  6/15/2020

## 2020-06-17 ENCOUNTER — HOSPITAL ENCOUNTER (OUTPATIENT)
Dept: PHYSICAL THERAPY | Facility: HOSPITAL | Age: 50
Setting detail: THERAPIES SERIES
Discharge: HOME | End: 2020-06-17

## 2020-06-17 DIAGNOSIS — M25.612 DECREASED ROM OF LEFT SHOULDER: Primary | ICD-10-CM

## 2020-06-17 DIAGNOSIS — M25.512 ACUTE PAIN OF LEFT SHOULDER: ICD-10-CM

## 2020-06-17 PROCEDURE — 97140 MANUAL THERAPY 1/> REGIONS: CPT | Performed by: PHYSICAL THERAPIST

## 2020-06-17 PROCEDURE — 97110 THERAPEUTIC EXERCISES: CPT | Performed by: PHYSICAL THERAPIST

## 2020-06-17 NOTE — THERAPY TREATMENT NOTE
Outpatient Physical Therapy Ortho Treatment Note  [facility]     Patient Name: Karena Kent  : 1970  MRN: [id number]  Today's Date: 2020      Visit Date: 2020  Subjective Improvement: 75%  MD visit: none scheduled  Visit Number:   Total Approved:60 a year.  Recert Date: 2020  Visit Dx:    ICD-10-CM ICD-9-CM   1. Decreased ROM of left shoulder M25.612 719.51   2. Acute pain of left shoulder M25.512 719.41       Patient Active Problem List   Diagnosis   • Acute idiopathic gout of left foot   • Malaise   • Wellness examination   • Pain of both eyes   • New onset headache   • Vision changes   • Hot flashes   • Cough   • Acute pain of left shoulder   • Decreased ROM of left shoulder        Past Medical History:   Diagnosis Date   • Encounter for general adult medical examination without abnormal findings     wellness exam      • Hand pain    • Hip pain    • Normal gynecologic examination    • Rib pain    • Shoulder joint pain         Past Surgical History:   Procedure Laterality Date   • AUGMENTATION MAMMAPLASTY     • BREAST IMPLANT SURGERY     • HYSTEROSCOPY  2008    LUDA Castro&MAAME, hysterscopy, cautery of cervical ectropion secondary to menorrhagia and postcoital bleeding       PT Ortho     Row Name 20 1600       Subjective Comments    Subjective Comments  Patient reports minimal pain over last few days. Pain she has had is primarily  with HEP. She was able to push up out of tub for first time today without pain.   -SW       Subjective Pain    Able to rate subjective pain?  yes  -SW    Pre-Treatment Pain Level  0  -SW    Post-Treatment Pain Level  0  -SW      User Key  (r) = Recorded By, (t) = Taken By, (c) = Cosigned By    Initials Name Provider Type    Manjula Alvarez Physical Therapist                      PT Assessment/Plan     Row Name 20 1600          PT Assessment    Assessment Comments  Patient exhibits pain at posterior shoulder primarily with lasat  "25% of er and abuction. She was able to move through full ER ROM without pain after manual therapy.   -SW        PT Plan    PT Frequency  2x/week  -SW     Predicted Duration of Therapy Intervention (Therapy Eval)  4 weeks  -SW     PT Plan Comments  Progress strengthening exercises as patient tolerated. Add D1 and D2 flexion and extension with tb at next visit.   -SW       User Key  (r) = Recorded By, (t) = Taken By, (c) = Cosigned By    Initials Name Provider Type    Manjula Alvarez Physical Therapist            OP Exercises     Row Name 06/17/20 1600             Subjective Comments    Subjective Comments  Patient reports minimal pain over last few days. Pain she has had is primarily  with HEP. She was able to push up out of tub for first time today without pain.   -SW         Subjective Pain    Able to rate subjective pain?  yes  -SW      Pre-Treatment Pain Level  0  -SW      Post-Treatment Pain Level  0  -SW      Subjective Pain Comment  muscle fatigue  -SW         Exercise 1    Exercise Name 1  Pro II L3  -SW      Time 1  5'F/5'B  -SW         Exercise 2    Exercise Name 2  see manual  -SW         Exercise 3    Exercise Name 3  punches 3#  -SW      Reps 3  20  -SW         Exercise 4    Exercise Name 4  Er sidelying 3#  -SW      Reps 4  20  -SW         Exercise 5    Exercise Name 5  rows/ext/ER/IR red tb  -SW      Reps 5  20  -SW         Exercise 6    Exercise Name 6  body blade elbow by side  -SW      Reps 6  20\"x3  -SW         Exercise 7    Exercise Name 7  h abduction red tb  -SW      Reps 7  20  -SW        User Key  (r) = Recorded By, (t) = Taken By, (c) = Cosigned By    Initials Name Provider Type    Manjula Alvarez Physical Therapist                      Manual Rx (last 36 hours)      Manual Treatments     Row Name 06/17/20 1500             Manual Rx 1    Manual Rx 1 Location  left shoulder  -SW      Manual Rx 1 Type  mobs/PROM/MFR  -SW      Manual Rx 1 Grade  3-4  -SW      Manual Rx 1 Duration  12'  -SW   "      User Key  (r) = Recorded By, (t) = Taken By, (c) = Cosigned By    Initials Name Provider Type    Manjula Alvarez Physical Therapist          PT OP Goals     Row Name 06/17/20 1600          PT Short Term Goals    STG Date to Achieve  -- STGs deferred  -SW        Long Term Goals    LTG Date to Achieve  07/02/20  -SW     LTG 1  Independent with HEP/self-management.  -SW     LTG 1 Progress  Ongoing;Progressing  -SW     LTG 2  L shoulder AROM WNLs and painfree.  -SW     LTG 2 Progress  Ongoing  -SW     LTG 3  Minimal to no pain L shoulder.  -SW     LTG 3 Progress  Met  -        Time Calculation    PT Goal Re-Cert Due Date  06/25/20  -       User Key  (r) = Recorded By, (t) = Taken By, (c) = Cosigned By    Initials Name Provider Type    Manjula Alavrez Physical Therapist                         Time Calculation:   Start Time: 1601  Stop Time: 1639  Time Calculation (min): 38 min  Therapy Charges for Today     Code Description Service Date Service Provider Modifiers Qty    16281516805 HC PT MANUAL THERAPY EA 15 MIN 6/17/2020 Manjula Batista GP 1    94578075005 HC PT THER PROC EA 15 MIN 6/17/2020 Manjula Batista GP 2                    Manjula Batista  6/17/2020

## 2020-06-22 ENCOUNTER — APPOINTMENT (OUTPATIENT)
Dept: PHYSICAL THERAPY | Facility: HOSPITAL | Age: 50
End: 2020-06-22

## 2020-06-23 ENCOUNTER — HOSPITAL ENCOUNTER (OUTPATIENT)
Dept: PHYSICAL THERAPY | Facility: HOSPITAL | Age: 50
Setting detail: THERAPIES SERIES
Discharge: HOME | End: 2020-06-23

## 2020-06-23 DIAGNOSIS — M25.512 ACUTE PAIN OF LEFT SHOULDER: ICD-10-CM

## 2020-06-23 DIAGNOSIS — M25.612 DECREASED ROM OF LEFT SHOULDER: Primary | ICD-10-CM

## 2020-06-23 PROCEDURE — 97110 THERAPEUTIC EXERCISES: CPT

## 2020-06-23 PROCEDURE — 97035 APP MDLTY 1+ULTRASOUND EA 15: CPT

## 2020-06-23 NOTE — THERAPY TREATMENT NOTE
Outpatient Physical Therapy Ortho Treatment Note  [facility]     Patient Name: Karena Kent  : 1970  MRN: [id number]  Today's Date: 2020      Visit Date: 2020  Subjective Improvement: 75%  MD visit: None  Visit Number:   Total Approved:e60 a year  Recert Date: 2020  Visit Dx:    ICD-10-CM ICD-9-CM   1. Decreased ROM of left shoulder M25.612 719.51   2. Acute pain of left shoulder M25.512 719.41       Patient Active Problem List   Diagnosis   • Acute idiopathic gout of left foot   • Malaise   • Wellness examination   • Pain of both eyes   • New onset headache   • Vision changes   • Hot flashes   • Cough   • Acute pain of left shoulder   • Decreased ROM of left shoulder        Past Medical History:   Diagnosis Date   • Encounter for general adult medical examination without abnormal findings     wellness exam      • Hand pain    • Hip pain    • Normal gynecologic examination    • Rib pain    • Shoulder joint pain         Past Surgical History:   Procedure Laterality Date   • AUGMENTATION MAMMAPLASTY     • BREAST IMPLANT SURGERY     • HYSTEROSCOPY      LUDA Castro&MAAME, hysterscopy, cautery of cervical ectropion secondary to menorrhagia and postcoital bleeding       PT Ortho     Row Name 20 1600       Subjective Comments    Subjective Comments  Pt reported she had pain on Saturday while shopping . Pt reported that she was not holding any bags. Pt had to take over the counter medication twice that day. Pt reports 75% improved over all. pt does HEP everyday. Pt does therabands every other day.  -TL       Precautions and Contraindications    Precautions/Limitations  no known precautions/limitations  -TL       Subjective Pain    Able to rate subjective pain?  yes  -TL    Pre-Treatment Pain Level  0  -TL    Post-Treatment Pain Level  0  -TL    Subjective Pain Comment  muscle fatigue   -TL      User Key  (r) = Recorded By, (t) = Taken By, (c) = Cosigned By    Initials  Name Provider Type    Johnna Yusuf PTA Physical Therapy Assistant                      PT Assessment/Plan     Row Name 06/23/20 1600          PT Assessment    Assessment Comments  PTA held D1 and D2 shld ex with resistance today secondary to pt c/o pain on Saturday. Pt reports that she is 75% improved but still has pain with motion. Pt denied pain after therapy session. Pt to have recert next visit. Instructed pt to use ice to help with soreness from resistive ex.  -TL        PT Plan    PT Frequency  2x/week  -TL     Predicted Duration of Therapy Intervention (Therapy Eval)  4 weeks  -TL     PT Plan Comments  Progress to D1 and D2 as pt tolerates.  -TL       User Key  (r) = Recorded By, (t) = Taken By, (c) = Cosigned By    Initials Name Provider Type    Johnna Yusuf PTA Physical Therapy Assistant          Modalities     Row Name 06/23/20 1600             Ice    Patient reports will apply ice at home to involved area  -- ice to go  -TL         Ultrasound 59761    Location  posterior left shld  -TL      Duty Cycle  100  -TL      Frequency  1.0 MHz  -TL      Intensity - Wts/cm  1.5  -TL      03110 - PT Ultrasound Minutes  8  -TL        User Key  (r) = Recorded By, (t) = Taken By, (c) = Cosigned By    Initials Name Provider Type    Johnna Yusuf PTA Physical Therapy Assistant        OP Exercises     Row Name 06/23/20 1600             Subjective Comments    Subjective Comments  Pt reported she had pain on Saturday while shopping . Pt reported that she was not holding any bags. Pt had to take over the counter medication twice that day. Pt reports 75% improved over all. pt does HEP everyday. Pt does therabands every other day.  -TL         Subjective Pain    Able to rate subjective pain?  yes  -TL      Pre-Treatment Pain Level  0  -TL      Post-Treatment Pain Level  0  -TL      Subjective Pain Comment  muscle fatigue   -TL         Exercise 1    Exercise Name 1  Pro ll UE level 4  -TL      Time 1  5  min fwd/5 mins back  -TL         Exercise 2    Exercise Name 2  US left posterior shld  -TL      Time 2  8 mins  -TL         Exercise 3    Exercise Name 3  punches 3#  -TL      Reps 3  20  -TL         Exercise 4    Exercise Name 4  ER sidelying 3#  -TL      Reps 4  20  -TL         Exercise 5    Exercise Name 5  rows/ext/ER/IR red tb  -TL      Reps 5  20  -TL        User Key  (r) = Recorded By, (t) = Taken By, (c) = Cosigned By    Initials Name Provider Type    Johnna Yusuf PTA Physical Therapy Assistant                       PT OP Goals     Row Name 06/23/20 1604          PT Short Term Goals    STG Date to Achieve  -- STGs deferred  -TL        Long Term Goals    LTG Date to Achieve  07/02/20  -TL     LTG 1  Independent with HEP/self-management.  -TL     LTG 1 Progress  Ongoing;Progressing  -TL     LTG 2  L shoulder AROM WNLs and painfree.  -TL     LTG 2 Progress  Ongoing;Progressing  -TL     LTG 3  Minimal to no pain L shoulder.  -TL     LTG 3 Progress  Met  -TL        Time Calculation    PT Goal Re-Cert Due Date  06/25/20  -TL       User Key  (r) = Recorded By, (t) = Taken By, (c) = Cosigned By    Initials Name Provider Type    Johnna Yusuf PTA Physical Therapy Assistant                         Time Calculation:   Start Time: 1604  Stop Time: 1643  Time Calculation (min): 39 min  Total Timed Code Minutes- PT: 39 minute(s)  Therapy Charges for Today     Code Description Service Date Service Provider Modifiers Qty    00563532572 HC PT ULTRASOUND EA 15 MIN 6/23/2020 Johnna Gimenez PTA GP 1    48173913111 HC PT THER PROC EA 15 MIN 6/23/2020 Johnna Gimenez PTA GP 2                    Johnna Gimenez PTA  6/23/2020

## 2020-06-25 ENCOUNTER — HOSPITAL ENCOUNTER (OUTPATIENT)
Dept: PHYSICAL THERAPY | Facility: HOSPITAL | Age: 50
Setting detail: THERAPIES SERIES
Discharge: HOME | End: 2020-06-25

## 2020-06-25 DIAGNOSIS — M25.512 ACUTE PAIN OF LEFT SHOULDER: ICD-10-CM

## 2020-06-25 DIAGNOSIS — M25.612 DECREASED ROM OF LEFT SHOULDER: Primary | ICD-10-CM

## 2020-06-25 PROCEDURE — 97035 APP MDLTY 1+ULTRASOUND EA 15: CPT | Performed by: PHYSICAL THERAPIST

## 2020-06-25 PROCEDURE — 97110 THERAPEUTIC EXERCISES: CPT | Performed by: PHYSICAL THERAPIST

## 2020-06-25 NOTE — THERAPY PROGRESS REPORT/RE-CERT
"    Outpatient Physical Therapy Ortho Progress Note  [facility]     Patient Name: Karena Kent  : 1970  MRN: [id number]  Today's Date: 2020      Visit Date: 2020  Attendance:  (60/yr)  Subjective Improvement: 75%  Next MD Appt: none scheduled  Recert Date: 2020     Therapy Diagnosis: L shoulder pain/posterior impingement     Changes in Medications: none  Changes in MD Orders: none  Number of Work Days Lost: none       Past Medical History:   Diagnosis Date   • Encounter for general adult medical examination without abnormal findings     wellness exam      • Hand pain    • Hip pain    • Normal gynecologic examination    • Rib pain    • Shoulder joint pain         Past Surgical History:   Procedure Laterality Date   • AUGMENTATION MAMMAPLASTY     • BREAST IMPLANT SURGERY     • HYSTEROSCOPY      ANU Castro, hysterscopy, cautery of cervical ectropion secondary to menorrhagia and postcoital bleeding       Visit Dx:     ICD-10-CM ICD-9-CM   1. Decreased ROM of left shoulder M25.612 719.51   2. Acute pain of left shoulder M25.512 719.41             PT Ortho     Row Name 20 1600       Subjective Comments    Subjective Comments  \"The baseline discomfort is almost completely improved. I no longer have discomfort daily.\" Notes continued pain/discomfort with ER in abducted position. No medication changes. 75% subjective improvement.  -SS       Precautions and Contraindications    Precautions/Limitations  no known precautions/limitations  -SS       Subjective Pain    Able to rate subjective pain?  yes  -SS    Pre-Treatment Pain Level  0  -SS       Posture/Observations    Posture/Observations Comments  Rounded shoulder posture, L>R, with tipping of L scapula. Forward head. Mild scapular winging B.   -SS       Shoulder Impingement/Rotator Cuff Special Tests    Miranda-Rohith Test (RC Lesion vs. Bursitis)  Left:;Positive posterior shoulder pain  -SS    Neer Impingement Test " (RC Lesion vs. Bursitis)  Left:;Negative  -SS    Empty Can Test (RC Lesion)  Left:;Negative  -SS    Drop Arm Test (Full Thickness RC Lesion)  Left:;Negative  -SS    Speed's Test (LH of Biceps Lesion)  Left: posterior shoulder pain with positioning  -SS    Shoulder Impingement/Rotator Cuff Special Tests Comments  TTP posterior shoulder/IS tendon. Scapular relocation test minimal change.  -SS       Left Upper Ext    Lt Shoulder Abduction AROM  165 deg, pain posterior shoulder   -SS    Lt Shoulder Extension AROM  52 deg  -SS    Lt Shoulder Flexion AROM  155 deg, pain posterior shoulder/triceps  -SS    Lt Shoulder External Rotation AROM  62 deg; supine 90/90 position; pain posterior shoulder  -SS    Lt Shoulder Internal Rotation AROM  78 deg; supine 90/90 position  -SS      User Key  (r) = Recorded By, (t) = Taken By, (c) = Cosigned By    Initials Name Provider Type    Anthony Louise, PT DPT Physical Therapist            Therapy Education  Education Details: doorway pec stretch, prone Hughstons; otherwise, hold all other HEP but okay to do stretches; VTD ergonomics  Given: HEP, Posture/body mechanics  Program: New  How Provided: Verbal, Demonstration, Written  Provided to: Patient  Level of Understanding: Verbalized     PT OP Goals     Row Name 06/25/20 1600          PT Short Term Goals    STG Date to Achieve  -- STGs deferred  -SS        Long Term Goals    LTG Date to Achieve  07/02/20  -     LTG 1  Independent with HEP/self-management.  -SS     LTG 1 Progress  Ongoing;Progressing  -SS     LTG 2  L shoulder AROM WNLs and painfree.  -SS     LTG 2 Progress  Ongoing;Progressing  -SS     LTG 3  Minimal to no pain L shoulder.  -     LTG 3 Progress  Met  -        Time Calculation    PT Goal Re-Cert Due Date  07/16/20  -       User Key  (r) = Recorded By, (t) = Taken By, (c) = Cosigned By    Initials Name Provider Type    Anthony Louise, PT DPT Physical Therapist          PT Assessment/Plan      "Row Name 06/25/20 1600          PT Assessment    Functional Limitations  Limitations in functional capacity and performance  -     Impairments  Pain;Range of motion;Joint mobility  -     Assessment Comments  Continued signs and symptoms of posterior shoulder impingement. Patient verbalizes understanding of VTD ergonomics instruction.   -SS     Rehab Potential  Good  -SS     Patient/caregiver participated in establishment of treatment plan and goals  Yes  -SS     Patient would benefit from skilled therapy intervention  Yes  -SS        PT Plan    PT Frequency  2x/week  -     Predicted Duration of Therapy Intervention (Therapy Eval)  2-3 weeks  -     PT Plan Comments  US to posterior shoulder, stretching, strengthening, scapular stabilization, ice as needed for pain  -SS       User Key  (r) = Recorded By, (t) = Taken By, (c) = Cosigned By    Initials Name Provider Type    Anthony Louise, PT DPT Physical Therapist          Modalities     Row Name 06/25/20 1600             Ultrasound 95641    Location  posterior L shoulder  -      Duty Cycle  100  -SS      Frequency  1.0 MHz  -SS      Intensity - Wts/cm  1.5  -SS      47401 - PT Ultrasound Minutes  8  -SS        User Key  (r) = Recorded By, (t) = Taken By, (c) = Cosigned By    Initials Name Provider Type    Anthony Louise, PT DPT Physical Therapist        OP Exercises     Row Name 06/25/20 1600             Subjective Comments    Subjective Comments  \"The baseline discomfort is almost completely improved. I no longer have discomfort daily.\" Notes continued pain/discomfort with ER in abducted position. No medication changes. 75% subjective improvement.  -SS         Subjective Pain    Able to rate subjective pain?  yes  -      Pre-Treatment Pain Level  0  -SS      Post-Treatment Pain Level  0  -SS         Exercise 1    Exercise Name 1  recheck  -         Exercise 2    Exercise Name 2  US -- see Modalities  -         Exercise 3    " Exercise Name 3  VTD ergonomics discussion  -SS         Exercise 4    Exercise Name 4  Prone Hughstons  -SS      Cueing 4  Verbal;Tactile  -SS      Reps 4  10 each  -SS      Time 4  2 sec hold  -SS        User Key  (r) = Recorded By, (t) = Taken By, (c) = Cosigned By    Initials Name Provider Type    SS Anthony Lorenzo, PT DPT Physical Therapist           Outcome Measure Options: Quick DASH  Quick DASH  Open a tight or new jar.: Moderate Difficulty  Do heavy household chores (e.g., wash walls, wash floors): No Difficulty  Carry a shopping bag or briefcase: No Difficulty  Wash your back: Mild Difficulty  Use a knife to cut food: No Difficulty  Recreational activities in which you take some force or impact through your arm, should or hand (e.g. golf, hammering, tennis, etc.): Moderate Difficulty  During the past week, to what extent has your arm, shoulder, or hand problem interfered with your normal social activities with family, friends, neighbors or groups?: Not at all  During the past week, were you limited in your work or other regular daily activities as a result of your arm, shoulder or hand problem?: Slightly Limited  Arm, Shoulder, or hand pain: Moderate  Tingling (pins and needles) in your arm, shoulder, or hand: None  During the past week, how much difficulty have you had sleeping because of the pain in your arm, shoulder or hand?: No difficulty  Number of Questions Answered: 11  Quick DASH Score: 18.18         Time Calculation:     Start Time: 1558  Stop Time: 1645  Time Calculation (min): 47 min     Therapy Charges for Today     Code Description Service Date Service Provider Modifiers Qty    07097174008 HC PT ULTRASOUND EA 15 MIN 6/25/2020 Anthony Lorenzo, PT DPT GP 1    09066637779 HC PT THER PROC EA 15 MIN 6/25/2020 Anthony Lorenzo, PT DPT GP 2                   Anthony Lorenzo, PT, DPT, CHT  6/25/2020

## 2020-06-30 ENCOUNTER — HOSPITAL ENCOUNTER (OUTPATIENT)
Dept: PHYSICAL THERAPY | Facility: HOSPITAL | Age: 50
Setting detail: THERAPIES SERIES
Discharge: HOME | End: 2020-06-30

## 2020-06-30 DIAGNOSIS — M25.512 ACUTE PAIN OF LEFT SHOULDER: ICD-10-CM

## 2020-06-30 DIAGNOSIS — M25.612 DECREASED ROM OF LEFT SHOULDER: Primary | ICD-10-CM

## 2020-06-30 PROCEDURE — 97110 THERAPEUTIC EXERCISES: CPT

## 2020-06-30 PROCEDURE — 97035 APP MDLTY 1+ULTRASOUND EA 15: CPT

## 2020-07-02 ENCOUNTER — HOSPITAL ENCOUNTER (OUTPATIENT)
Dept: PHYSICAL THERAPY | Facility: HOSPITAL | Age: 50
Setting detail: THERAPIES SERIES
Discharge: HOME | End: 2020-07-02

## 2020-07-02 DIAGNOSIS — M25.612 DECREASED ROM OF LEFT SHOULDER: Primary | ICD-10-CM

## 2020-07-02 DIAGNOSIS — M25.512 ACUTE PAIN OF LEFT SHOULDER: ICD-10-CM

## 2020-07-02 PROCEDURE — 97110 THERAPEUTIC EXERCISES: CPT

## 2020-07-02 PROCEDURE — 97035 APP MDLTY 1+ULTRASOUND EA 15: CPT

## 2020-07-02 NOTE — THERAPY TREATMENT NOTE
Outpatient Physical Therapy Ortho Treatment Note  [facility]     Patient Name: Karena Kent  : 1970  MRN: [id number]  Today's Date: 2020      Visit Date: 2020  Subjective Improvement: 75%  MD visit: July 10,2020  Visit Number: 8/8  Total Approved:60 a year  Recert Date: 2020  Visit Dx:    ICD-10-CM ICD-9-CM   1. Decreased ROM of left shoulder M25.612 719.51   2. Acute pain of left shoulder M25.512 719.41       Patient Active Problem List   Diagnosis   • Acute idiopathic gout of left foot   • Malaise   • Wellness examination   • Pain of both eyes   • New onset headache   • Vision changes   • Hot flashes   • Cough   • Acute pain of left shoulder   • Decreased ROM of left shoulder        Past Medical History:   Diagnosis Date   • Encounter for general adult medical examination without abnormal findings     wellness exam      • Hand pain    • Hip pain    • Normal gynecologic examination    • Rib pain    • Shoulder joint pain         Past Surgical History:   Procedure Laterality Date   • AUGMENTATION MAMMAPLASTY     • BREAST IMPLANT SURGERY     • HYSTEROSCOPY      LUDA Castro&MAAME, hysterscopy, cautery of cervical ectropion secondary to menorrhagia and postcoital bleeding       PT Ortho     Row Name 20 1600       Subjective Comments    Subjective Comments  Pt has appt with Jacquelyn ROSALES on .  -TL       Precautions and Contraindications    Precautions/Limitations  no known precautions/limitations  -TL       Subjective Pain    Able to rate subjective pain?  yes  -TL    Pre-Treatment Pain Level  2  -TL    Post-Treatment Pain Level  2 pt decline ice to day  -TL    Subjective Pain Comment  constant ache  -TL       Posture/Observations    Posture/Observations Comments  Rounded shoulder posture, L>R, with tipping of L scapula. Forward head. Mild scapular winging B.   -TL      User Key  (r) = Recorded By, (t) = Taken By, (c) = Cosigned By    Initials Name Provider  Type    TL Johnna Gimenez PTA Physical Therapy Assistant                      PT Assessment/Plan     Row Name 07/02/20 1600          PT Assessment    Assessment Comments  PTA spoke with therapist concerning pt now having constant pain. Pt having pain with 90degree lift in abd and scaption at eye level with new ex. PTA instructed pt to go only to painfree level. Pt has pain with external rotation and abd at this time.No pain with glenohumeral mob/scap Inferiro mob this date.  -TL        PT Plan    PT Frequency  2x/week  -TL     Predicted Duration of Therapy Intervention (Therapy Eval)  2-3 weeks  -TL     PT Plan Comments  write note next THurdays for pts appt on Friday. Continue US and follow up with how pt doing with HEP.  -TL       User Key  (r) = Recorded By, (t) = Taken By, (c) = Cosigned By    Initials Name Provider Type    Johnna Yusuf PTA Physical Therapy Assistant          Modalities     Row Name 07/02/20 1600             Ultrasound 19078    Location  posteror L shoulder  -TL      Duty Cycle  100  -TL      Frequency  1.0 MHz  -TL      Intensity - Wts/cm  1.5  -TL      07100 - PT Ultrasound Minutes  8  -TL        User Key  (r) = Recorded By, (t) = Taken By, (c) = Cosigned By    Initials Name Provider Type    Johnna Yusuf PTA Physical Therapy Assistant        OP Exercises     Row Name 07/02/20 1600             Subjective Comments    Subjective Comments  Pt has appt with Jacquelyn ROSALES on Friday 10th.  -TL         Subjective Pain    Able to rate subjective pain?  yes  -TL      Pre-Treatment Pain Level  2  -TL      Post-Treatment Pain Level  2 pt decline ice to day  -TL      Subjective Pain Comment  constant ache  -TL         Exercise 1    Exercise Name 1  pro ll fwd/back  -TL      Time 1  5 mins fwd/5 mins back  -TL         Exercise 2    Exercise Name 2  Prone Hughnstons   -TL      Cueing 2  Verbal  -TL      Reps 2  10  -TL      Time 2  2 sec hold  -TL      Additional Comments  only to  painfree range  -TL         Exercise 3    Exercise Name 3  against wall chin tuck/shld retraction/ab tuck/arms ext and push into wall.  -TL      Sets 3  2  -TL      Reps 3  10  -TL      Time 3  5 sec hold  -TL         Exercise 4    Exercise Name 4  see manual  -TL        User Key  (r) = Recorded By, (t) = Taken By, (c) = Cosigned By    Initials Name Provider Type    Johnna Yusuf PTA Physical Therapy Assistant                      Manual Rx (last 36 hours)      Manual Treatments     Row Name 07/02/20 1700             Manual Rx 1    Manual Rx 1 Location  left shld  -TL      Manual Rx 1 Type  post glenohumeral mob/scapula mob inferior  -TL      Manual Rx 1 Duration  5 mins  -TL        User Key  (r) = Recorded By, (t) = Taken By, (c) = Cosigned By    Initials Name Provider Type    Johnna Yusuf PTA Physical Therapy Assistant          PT OP Goals     Row Name 07/02/20 1600          PT Short Term Goals    STG Date to Achieve  -- STGs deferred  -TL        Long Term Goals    LTG Date to Achieve  07/02/20  -TL     LTG 1  Independent with HEP/self-management.  -TL     LTG 1 Progress  Ongoing;Progressing  -TL     LTG 2  L shoulder AROM WNLs and painfree.  -TL     LTG 2 Progress  Ongoing;Progressing  -TL     LTG 3  Minimal to no pain L shoulder.  -TL     LTG 3 Progress  Met  -TL        Time Calculation    PT Goal Re-Cert Due Date  07/16/20  -TL       User Key  (r) = Recorded By, (t) = Taken By, (c) = Cosigned By    Initials Name Provider Type    Johnna Yusuf PTA Physical Therapy Assistant                         Time Calculation:   Start Time: 1600  Stop Time: 1650  Time Calculation (min): 50 min  Total Timed Code Minutes- PT: 50 minute(s)  Therapy Charges for Today     Code Description Service Date Service Provider Modifiers Qty    97468516402 HC PT ULTRASOUND EA 15 MIN 7/2/2020 Johnna Gimenez PTA GP 1    97244750592 HC PT THER PROC EA 15 MIN 7/2/2020 Johnna Gimenez PTA GP 2                     Johnna Gimenez, PTA  7/2/2020

## 2020-07-07 ENCOUNTER — APPOINTMENT (OUTPATIENT)
Dept: PHYSICAL THERAPY | Facility: HOSPITAL | Age: 50
End: 2020-07-07

## 2020-07-09 ENCOUNTER — HOSPITAL ENCOUNTER (OUTPATIENT)
Dept: PHYSICAL THERAPY | Facility: HOSPITAL | Age: 50
Setting detail: THERAPIES SERIES
Discharge: HOME | End: 2020-07-09

## 2020-07-09 DIAGNOSIS — M25.512 ACUTE PAIN OF LEFT SHOULDER: ICD-10-CM

## 2020-07-09 DIAGNOSIS — M25.612 DECREASED ROM OF LEFT SHOULDER: Primary | ICD-10-CM

## 2020-07-09 PROCEDURE — 97110 THERAPEUTIC EXERCISES: CPT

## 2020-07-09 PROCEDURE — 97035 APP MDLTY 1+ULTRASOUND EA 15: CPT

## 2020-07-09 NOTE — THERAPY TREATMENT NOTE
Outpatient Physical Therapy Ortho Treatment Note  [facility]     Patient Name: Karena Kent  : 1970  MRN: [id number]  Today's Date: 2020      Visit Date: 2020  Subjective Improvement: 85%  MD visit: July 10,2020  Visit Number: 9/10  Total Approved:60 a year  Recert Date: 2020  Visit Dx:    ICD-10-CM ICD-9-CM   1. Decreased ROM of left shoulder M25.612 719.51   2. Acute pain of left shoulder M25.512 719.41       Patient Active Problem List   Diagnosis   • Acute idiopathic gout of left foot   • Malaise   • Wellness examination   • Pain of both eyes   • New onset headache   • Vision changes   • Hot flashes   • Cough   • Acute pain of left shoulder   • Decreased ROM of left shoulder        Past Medical History:   Diagnosis Date   • Encounter for general adult medical examination without abnormal findings     wellness exam      • Hand pain    • Hip pain    • Normal gynecologic examination    • Rib pain    • Shoulder joint pain         Past Surgical History:   Procedure Laterality Date   • AUGMENTATION MAMMAPLASTY     • BREAST IMPLANT SURGERY     • HYSTEROSCOPY      LUDA Castro&MAAME, hysterscopy, cautery of cervical ectropion secondary to menorrhagia and postcoital bleeding       PT Ortho     Row Name 20 1600       Subjective Comments    Subjective Comments  Pt reports 85% improved.  Pt denied constant pain the last few days.  -TL       Precautions and Contraindications    Precautions/Limitations  no known precautions/limitations  -TL       Subjective Pain    Able to rate subjective pain?  yes  -TL    Pre-Treatment Pain Level  0  -TL       Posture/Observations    Posture/Observations Comments  Rounded shoulder posture, L>R, with tipping of L scapula. Forward head. Mild scapular winging B.   -TL       Left Upper Ext    Lt Shoulder Abduction AROM  170  -TL    Lt Shoulder Flexion AROM  165  -TL    Lt Shoulder External Rotation AROM  66 supine @90 degrees shld abd  -TL    Lt  Shoulder Internal Rotation AROM  80 supine @90 shld abd  -TL      User Key  (r) = Recorded By, (t) = Taken By, (c) = Cosigned By    Initials Name Provider Type    Johnna Yusuf PTA Physical Therapy Assistant                      PT Assessment/Plan     Row Name 07/09/20 1600          PT Assessment    Assessment Comments  Pt reports 85% improved. Pt has met LTG #3 and progressing with self management. PTA sent note to Doctor for pts doctor appt. Pt has improved with ROM but still has some pain.  -TL        PT Plan    PT Frequency  2x/week  -TL     Predicted Duration of Therapy Intervention (Therapy Eval)  1 more week  -TL     PT Plan Comments  d/c next week  -TL       User Key  (r) = Recorded By, (t) = Taken By, (c) = Cosigned By    Initials Name Provider Type    Johnna Yusuf PTA Physical Therapy Assistant          Modalities     Row Name 07/09/20 1600             Ultrasound 85982    Location  posteror L shoulder  -TL      Duty Cycle  100  -TL      Frequency  1.0 MHz  -TL      Intensity - Wts/cm  1.5  -TL      42920 - PT Ultrasound Minutes  8  -TL        User Key  (r) = Recorded By, (t) = Taken By, (c) = Cosigned By    Initials Name Provider Type    Johnna Yusuf PTA Physical Therapy Assistant        OP Exercises     Row Name 07/09/20 1600             Subjective Comments    Subjective Comments  Pt reports 85% improved.  Pt denied constant pain the last few days.  -TL         Subjective Pain    Able to rate subjective pain?  yes  -TL      Pre-Treatment Pain Level  0  -TL         Exercise 1    Exercise Name 1  pro ll fwd/back  -TL      Time 1  5 mins fwd/5 mins back  -TL         Exercise 2    Exercise Name 2  Prone Hughnstons   -TL      Reps 2  5  -TL      Time 2  2 sec hold each  -TL         Exercise 3    Exercise Name 3  against wall chin tuck/shld retraction/ab tuck/arms ext and push into wall.  -TL      Reps 3  20  -TL      Time 3  5 sec hold  -TL         Exercise 4    Exercise Name 4  see US   -TL         Exercise 5    Exercise Name 5  see measurements.  -TL         Exercise 6    Exercise Name 6  MD note.  -TL        User Key  (r) = Recorded By, (t) = Taken By, (c) = Cosigned By    Initials Name Provider Type    Johnna Yusuf PTA Physical Therapy Assistant                                          Time Calculation:   Start Time: 1600  Stop Time: 1646  Time Calculation (min): 46 min  Total Timed Code Minutes- PT: 46 minute(s)  Therapy Charges for Today     Code Description Service Date Service Provider Modifiers Qty    08439128993 HC PT THER PROC EA 15 MIN 7/9/2020 Johnna Gimenez PTA GP 2    70804896426 HC PT ULTRASOUND EA 15 MIN 7/9/2020 Johnna Gimenez, DEMARCO GP 1                    Johnna Gimenez PTA  7/9/2020

## 2020-07-10 ENCOUNTER — OFFICE VISIT (OUTPATIENT)
Dept: FAMILY MEDICINE CLINIC | Facility: CLINIC | Age: 50
End: 2020-07-10

## 2020-07-10 VITALS — WEIGHT: 140 LBS | HEIGHT: 60 IN | BODY MASS INDEX: 27.48 KG/M2

## 2020-07-10 DIAGNOSIS — M25.612 DECREASED ROM OF LEFT SHOULDER: Primary | ICD-10-CM

## 2020-07-10 DIAGNOSIS — R20.0 NUMBNESS: ICD-10-CM

## 2020-07-10 PROCEDURE — 99441 PR PHYS/QHP TELEPHONE EVALUATION 5-10 MIN: CPT | Performed by: NURSE PRACTITIONER

## 2020-07-10 NOTE — PROGRESS NOTES
"  Chief Complaint   Patient presents with   • Shoulder Pain     left shoulder pain follow up , has been doing therapy x 5 weeks but still having pain     Subjective   Karena Kent is a 50 y.o. female.     Presents with recheck of shoulder pain-finished pt with no results-see rehab progress note-symptoms are the same and worsening-decreased rom, pain and numbness now has developed -completed pt     Shoulder Injury    The left shoulder is affected. The incident occurred more than 1 week ago. There was no injury mechanism. The quality of the pain is described as burning, shooting and stabbing. The pain radiates to the left hand and left arm. The pain is at a severity of 10/10. The pain is severe. Associated symptoms include muscle weakness, numbness and tingling. Pertinent negatives include no chest pain. The symptoms are aggravated by movement, overhead lifting and palpation. She has tried acetaminophen, NSAIDs, elevation, heat, ice, immobilization and non-weight bearing (nsaids, steroids, pt finished with  no results  ) for the symptoms. The treatment provided no relief.        The following portions of the patient's history were reviewed and updated as appropriate: allergies, current medications, past social history and problem list.    Review of Systems   Eyes: Negative.    Cardiovascular: Negative for chest pain.   Endocrine: Negative.    Musculoskeletal: Positive for arthralgias, back pain, gait problem, joint swelling and myalgias. Negative for neck pain and neck stiffness.        Severe left shoulder pain with movement -decreased rom and movement    Neurological: Positive for tingling and numbness.   Hematological: Negative.    Psychiatric/Behavioral: Negative.  Negative for agitation and behavioral problems.       Objective   Ht 152.4 cm (60\")   Wt 63.5 kg (140 lb)   BMI 27.34 kg/m²   Physical Exam   Constitutional: She appears well-developed and well-nourished.   Limited due to telephone visit  "   Nursing note and vitals reviewed.      Assessment/Plan   Problem List Items Addressed This Visit        Nervous and Auditory    Numbness    Relevant Orders    MRI Shoulder Left Without Contrast       Other    Decreased ROM of left shoulder - Primary    Relevant Orders    MRI Shoulder Left Without Contrast         No orders of the defined types were placed in this encounter.     patient has tried and failed 6 weeks pt, nsaids and steroids-would benefit from an mri left shoulder-follow up with ortho after mri results are back     This visit has been rescheduled as a phone visit to comply with patient safety concerns in accordance with CDC recommendations. Total time of discussion was *10 minutes.  This was an audio and video enabled telemedicine encounter.    You have chosen to receive care through a telephone visit. Do you consent to use a telephone visit for your medical care today? Yes      Xray neg-see pt progress report

## 2020-07-14 ENCOUNTER — HOSPITAL ENCOUNTER (OUTPATIENT)
Dept: MRI IMAGING | Facility: HOSPITAL | Age: 50
Discharge: HOME | End: 2020-07-14
Admitting: NURSE PRACTITIONER

## 2020-07-14 ENCOUNTER — HOSPITAL ENCOUNTER (OUTPATIENT)
Dept: PHYSICAL THERAPY | Facility: HOSPITAL | Age: 50
Setting detail: THERAPIES SERIES
Discharge: HOME | End: 2020-07-14

## 2020-07-14 DIAGNOSIS — M25.612 DECREASED ROM OF LEFT SHOULDER: Primary | ICD-10-CM

## 2020-07-14 DIAGNOSIS — M25.512 ACUTE PAIN OF LEFT SHOULDER: ICD-10-CM

## 2020-07-14 PROCEDURE — 97140 MANUAL THERAPY 1/> REGIONS: CPT | Performed by: PHYSICAL THERAPIST

## 2020-07-14 PROCEDURE — 73221 MRI JOINT UPR EXTREM W/O DYE: CPT

## 2020-07-14 PROCEDURE — 97110 THERAPEUTIC EXERCISES: CPT | Performed by: PHYSICAL THERAPIST

## 2020-07-14 NOTE — THERAPY TREATMENT NOTE
Outpatient Physical Therapy Ortho Treatment Note  [facility]     Patient Name: Karena Kent  : 1970  MRN: [id number]  Today's Date: 2020      Visit Date: 2020  Subjective Improvement: 85%  MD visit: July 10,2020  Visit Number: 10/12  Total Approved:60 a year  Recert Date: 2020  Visit Dx:    ICD-10-CM ICD-9-CM   1. Decreased ROM of left shoulder M25.612 719.51   2. Acute pain of left shoulder M25.512 719.41       Patient Active Problem List   Diagnosis   • Acute idiopathic gout of left foot   • Malaise   • Wellness examination   • Pain of both eyes   • New onset headache   • Vision changes   • Hot flashes   • Cough   • Acute pain of left shoulder   • Decreased ROM of left shoulder   • Numbness        Past Medical History:   Diagnosis Date   • Encounter for general adult medical examination without abnormal findings     wellness exam      • Hand pain    • Hip pain    • Normal gynecologic examination    • Rib pain    • Shoulder joint pain         Past Surgical History:   Procedure Laterality Date   • AUGMENTATION MAMMAPLASTY     • BREAST IMPLANT SURGERY     • HYSTEROSCOPY  2008    ANU Castro, hysterscopy, cautery of cervical ectropion secondary to menorrhagia and postcoital bleeding       PT Ortho     Row Name 20 1600       Subjective Comments    Subjective Comments  Patient reports that she returned to referring provider and MRI was scheduled for today.   -SW       Subjective Pain    Pre-Treatment Pain Level  0  -SW       Right Upper Ext    Rt Shoulder Abduction AROM  WNL  -SW    Rt Shoulder Extension AROM  WNL  -SW    Rt Shoulder Flexion AROM  WNL  -SW    Rt Shoulder External Rotation AROM  T2   -SW    Rt Shoulder Internal Rotation AROM  T7  -SW       Left Upper Ext    Lt Shoulder Abduction AROM  WNL  -SW    Lt Shoulder Abduction PROM  WNL  -SW    Lt Shoulder Extension AROM  WNL  -SW    Lt Shoulder Extension PROM  WNL  -SW    Lt Shoulder Flexion AROM  WNL  -SW     Lt Shoulder Flexion PROM  WNL  -SW    Lt Shoulder External Rotation AROM  T2 painful  -SW    Lt Shoulder Internal Rotation AROM  T12  -SW       MMT Left Upper Ext    Lt Shoulder Flexion MMT, Gross Movement  (4/5) good  -SW    Lt Shoulder Extension MMT, Gross Movement  (5/5) normal  -SW    Lt Shoulder ABduction MMT, Gross Movement  (4/5) good  -SW    Lt Shoulder Internal Rotation MMT, Gross Movement  (5/5) normal  -SW    Lt Shoulder External Rotation MMT, Gross Movement  (5/5) normal  -SW      User Key  (r) = Recorded By, (t) = Taken By, (c) = Cosigned By    Initials Name Provider Type    Manjula Alvarez Physical Therapist                      PT Assessment/Plan     Row Name 07/14/20 1600          PT Assessment    Assessment Comments  Patient exhibits tightness in posterior shoulder which has caused head of humerous to migrate forward causing pain at end range ER and limiting active IR ROM. Patient was given sleeper stretch and IR with towel to stretch posterior shoulder and improve IR AROM.   -SW        PT Plan    PT Frequency  --  -SW     PT Plan Comments  Dc to independent HEP.   -SW       User Key  (r) = Recorded By, (t) = Taken By, (c) = Cosigned By    Initials Name Provider Type    Manjula Alvarez Physical Therapist            OP Exercises     Row Name 07/14/20 1600             Subjective Comments    Subjective Comments  Patient reports that she returned to referring provider and MRI was scheduled for today.   -SW         Subjective Pain    Able to rate subjective pain?  yes  -SW      Pre-Treatment Pain Level  0  -SW         Exercise 1    Exercise Name 1  pro II L3  -SW      Time 1  5'F/5'B  -SW         Exercise 2    Exercise Name 2  Er sidelying 5#  -SW      Reps 2  20  -SW         Exercise 3    Exercise Name 3  bent over retraction + extension 5#  -SW      Reps 3  20  -SW         Exercise 4    Exercise Name 4  D1 and D2 flexion and extension 2#  -SW      Reps 4  20  -SW         Exercise 5    Exercise Name 5  " rows/ext/er/ir red tb  -SW      Reps 5  20 ea  -SW         Exercise 6    Exercise Name 6  sleeper stretch  -SW      Reps 6  30\"x3  -SW         Exercise 7    Exercise Name 7  IR w towel  -SW      Reps 7  30\"x3  -SW        User Key  (r) = Recorded By, (t) = Taken By, (c) = Cosigned By    Initials Name Provider Type    Manjula Alvarez Physical Therapist                      Manual Rx (last 36 hours)      Manual Treatments     Row Name 07/14/20 1500             Manual Rx 1    Manual Rx 1 Location  left shld  -SW      Manual Rx 1 Type  PROM/mobs  -SW      Manual Rx 1 Duration  12'  -SW        User Key  (r) = Recorded By, (t) = Taken By, (c) = Cosigned By    Initials Name Provider Type    Manjula Alvarez Physical Therapist          PT OP Goals     Row Name 07/14/20 1600          PT Short Term Goals    STG Date to Achieve  -- STGs deferred  -SW        Long Term Goals    LTG Date to Achieve  07/02/20  -SW     LTG 1  Independent with HEP/self-management.  -SW     LTG 1 Progress  Met  -SW     LTG 2  L shoulder AROM WNLs and painfree.  -SW     LTG 2 Progress  Not Met  -SW     LTG 2 Progress Comments  pain at end range Er  -SW     LTG 3  Minimal to no pain L shoulder.  -SW     LTG 3 Progress  Met  -SW        Time Calculation    PT Goal Re-Cert Due Date  07/16/20  -SW       User Key  (r) = Recorded By, (t) = Taken By, (c) = Cosigned By    Initials Name Provider Type    Manjula Alvarez Physical Therapist                         Time Calculation:   Start Time: 1600  Stop Time: 1643  Time Calculation (min): 43 min  Therapy Charges for Today     Code Description Service Date Service Provider Modifiers Qty    06035142519 HC PT THER PROC EA 15 MIN 7/14/2020 Manjula Batista GP 2    10521911318 HC PT MANUAL THERAPY EA 15 MIN 7/14/2020 Manjula Batista GP 1                    Manjula Batista  7/14/2020     "

## 2020-07-15 DIAGNOSIS — M25.512 ACUTE PAIN OF LEFT SHOULDER: Primary | ICD-10-CM

## 2020-07-16 ENCOUNTER — APPOINTMENT (OUTPATIENT)
Dept: PHYSICAL THERAPY | Facility: HOSPITAL | Age: 50
End: 2020-07-16

## 2020-07-21 ENCOUNTER — APPOINTMENT (OUTPATIENT)
Dept: PHYSICAL THERAPY | Facility: HOSPITAL | Age: 50
End: 2020-07-21

## 2020-07-23 ENCOUNTER — APPOINTMENT (OUTPATIENT)
Dept: PHYSICAL THERAPY | Facility: HOSPITAL | Age: 50
End: 2020-07-23

## 2020-08-06 ENCOUNTER — OFFICE VISIT (OUTPATIENT)
Dept: ORTHOPEDIC SURGERY | Facility: CLINIC | Age: 50
End: 2020-08-06

## 2020-08-06 VITALS — HEIGHT: 60 IN | WEIGHT: 149 LBS | BODY MASS INDEX: 29.25 KG/M2

## 2020-08-06 DIAGNOSIS — M75.42 IMPINGEMENT SYNDROME OF LEFT SHOULDER: ICD-10-CM

## 2020-08-06 DIAGNOSIS — M25.512 ACUTE PAIN OF LEFT SHOULDER: ICD-10-CM

## 2020-08-06 DIAGNOSIS — M75.112 PARTIAL NONTRAUMATIC TEAR OF LEFT ROTATOR CUFF: Primary | ICD-10-CM

## 2020-08-06 PROCEDURE — 20610 DRAIN/INJ JOINT/BURSA W/O US: CPT | Performed by: ORTHOPAEDIC SURGERY

## 2020-08-06 PROCEDURE — 99203 OFFICE O/P NEW LOW 30 MIN: CPT | Performed by: ORTHOPAEDIC SURGERY

## 2020-08-06 RX ORDER — IBUPROFEN 400 MG/1
400 TABLET ORAL EVERY 6 HOURS PRN
COMMUNITY

## 2020-08-06 RX ADMIN — TRIAMCINOLONE ACETONIDE 40 MG: 40 INJECTION, SUSPENSION INTRA-ARTICULAR; INTRAMUSCULAR at 16:03

## 2020-08-06 RX ADMIN — LIDOCAINE HYDROCHLORIDE 2 ML: 10 INJECTION, SOLUTION INFILTRATION; PERINEURAL at 16:03

## 2020-08-06 NOTE — PROGRESS NOTES
Karena Kent is a 50 y.o. female   Primary provider:  Roselyn Valderrama APRN       Chief Complaint   Patient presents with   • Left Shoulder - Pain       HISTORY OF PRESENT ILLNESS:    Injury in October.  No specific injury.  No longer having pain at night but did to begin with.  Baseline discomfort better since physical therapy  Taking meloxicam which helps.  Still with sharp pains in certain activity.  Weakness with overhead activity.  No numbness or tingling  No fever or chills.      Pain   This is a new problem. The current episode started more than 1 month ago. The problem occurs intermittently. The problem has been unchanged. Associated symptoms comments: Stabbing,. Nothing aggravates the symptoms. She has tried rest for the symptoms.        CONCURRENT MEDICAL HISTORY:    Past Medical History:   Diagnosis Date   • Encounter for general adult medical examination without abnormal findings     wellness exam      • Hand pain    • Hip pain    • Normal gynecologic examination    • Rib pain    • Shoulder joint pain        No Known Allergies      Current Outpatient Medications:   •  Biotin 2500 MCG capsule, Take  by mouth., Disp: , Rfl:   •  COLLAGEN PO, Take  by mouth., Disp: , Rfl:   •  ibuprofen (ADVIL,MOTRIN) 400 MG tablet, Take 400 mg by mouth Every 6 (Six) Hours As Needed for Mild Pain ., Disp: , Rfl:   •  meloxicam (Mobic) 7.5 MG tablet, Take 1 tablet by mouth Daily., Disp: 30 tablet, Rfl: 11  •  Multiple Vitamins-Minerals (MULTIVITAMIN ADULT PO), Take  by mouth., Disp: , Rfl:   •  PARoxetine (PAXIL) 10 MG tablet, Take 1 tablet by mouth Every Morning., Disp: 30 tablet, Rfl: 11    Past Surgical History:   Procedure Laterality Date   • AUGMENTATION MAMMAPLASTY     • BREAST IMPLANT SURGERY  2002   • HYSTEROSCOPY  2008    ANU Castro, hysterscopy, cautery of cervical ectropion secondary to menorrhagia and postcoital bleeding       Family History   Problem Relation Age of Onset   • Hypertension  "Mother    • Hyperlipidemia Mother    • Depression Mother    • Hypertension Father    • Hyperlipidemia Father    • Breast cancer Maternal Grandmother    • Diabetes Other    • Hypertension Other    • Uterine cancer Other    • Ovarian cancer Other    • Ovarian cancer Maternal Aunt    • Breast cancer Maternal Aunt         Social History     Socioeconomic History   • Marital status:      Spouse name: Not on file   • Number of children: Not on file   • Years of education: Not on file   • Highest education level: Not on file   Tobacco Use   • Smoking status: Never Smoker   • Smokeless tobacco: Never Used        Review of Systems   Constitutional: Negative.    HENT: Negative.    Eyes: Negative.    Respiratory: Negative.    Cardiovascular: Negative.    Gastrointestinal: Negative.    Endocrine: Negative.    Genitourinary: Negative.    Musculoskeletal:        Right shoulder pain   Skin: Negative.    Allergic/Immunologic: Negative.    Neurological: Negative.    Hematological: Negative.    Psychiatric/Behavioral: Negative.        PHYSICAL EXAMINATION:       Ht 152.4 cm (60\")   Wt 67.6 kg (149 lb)   LMP  (LMP Unknown)   BMI 29.10 kg/m²     Physical Exam   Constitutional: She is oriented to person, place, and time. She appears well-developed and well-nourished.   Neurological: She is alert and oriented to person, place, and time.   Psychiatric: She has a normal mood and affect. Her behavior is normal. Judgment and thought content normal.       GAIT:     [x]  Normal  []  Antalgic    Assistive device: [x]  None  []  Walker     []  Crutches  []  Cane     []  Wheelchair  []  Stretcher    Right Shoulder Exam     Tenderness   The patient is experiencing no tenderness.    Range of Motion   The patient has normal right shoulder ROM.    Muscle Strength   The patient has normal right shoulder strength.    Tests   Miranda test: negative    Other   Erythema: absent  Sensation: normal  Pulse: present      Left Shoulder Exam "     Tenderness   The patient is experiencing tenderness in the acromioclavicular joint and acromion.    Range of Motion   Active abduction: 170   Forward flexion: 160     Muscle Strength   Abduction: 4/5   Supraspinatus: 4/5     Tests   Miranda test: positive  Impingement: positive    Other   Erythema: absent  Sensation: normal  Pulse: present               Mri Shoulder Left Without Contrast    Result Date: 7/15/2020  Narrative: MRI left shoulder. HISTORY: Left shoulder pain. Prior exam left shoulder May 13, 2020. TECHNIQUE: Multiplanar multisequence noncontrast images left shoulder. Normal biceps tendon. Normal glenoid murtaza. Normal subscapularis. Very subtle increased signal intensity anterior aspect of infraspinatus tendon beneath the acromion, subtle partial-thickness bursal surface tear. Supraspinatous and infraspinatus tendons are otherwise unremarkable. No full-thickness tear. Acromioclavicular joint arthrosis causing underlying mild impingement. Series 5 image eight. MRI right shoulder is otherwise unremarkable.     Impression: Acromioclavicular joint arthrosis causing underlying mild impingement. Subtle area of increased signal intensity anterior aspect of infraspinatus tendon beneath the acromion, suggesting a  subtle partial-thickness bursal surface tear. No evidence of a full-thickness rotator cuff tear. MRI left shoulder is otherwise unremarkable. Electronically signed by:  Pro Garcia MD  7/15/2020 6:51 AM CDT Workstation: [id number]         AP films with the humerus in internal and external rotation and  scapular Y view were obtained.     COMPARISON: None     FINDINGS:   No fracture or dislocation.  No other osseous or articular abnormality.     IMPRESSION:  CONCLUSION:  Normal left shoulder     [id number]     Electronically signed by:  Anthony Gilbert MD  5/13/2020 4:47 PM CDT  Workstation: [email or web address]       ASSESSMENT:    Diagnoses and all orders for this visit:    Partial nontraumatic tear of left rotator  cuff    Acute pain of left shoulder  -     Large Joint Arthrocentesis: L glenohumeral    Impingement syndrome of left shoulder    Other orders  -     COLLAGEN PO; Take  by mouth.  -     ibuprofen (ADVIL,MOTRIN) 400 MG tablet; Take 400 mg by mouth Every 6 (Six) Hours As Needed for Mild Pain .      Large Joint Arthrocentesis: L glenohumeral  Date/Time: 8/6/2020 4:03 PM  Consent given by: patient  Site marked: site marked  Timeout: Immediately prior to procedure a time out was called to verify the correct patient, procedure, equipment, support staff and site/side marked as required   Supporting Documentation  Indications: pain   Procedure Details  Location: shoulder - L glenohumeral  Needle size: 22 G  Approach: anteromedial  Medications administered: 40 mg triamcinolone acetonide 40 MG/ML; 2 mL lidocaine 1 %  Patient tolerance: patient tolerated the procedure well with no immediate complications            PLAN    Patient has partial rotator cuff tear with some impingement identified on MRI.  We discussed continuing home exercise program set forth by physical therapy.  We will try steroid injection today see if that helps with acute symptoms.  Continue anti-inflammatory medication.  We began the discussion for possible surgical intervention if her symptoms worsen or fail to improve.    Return in about 6 weeks (around 9/17/2020) for recheck.    Judd Virk MD  Answers for HPI/ROS submitted by the patient on 7/30/2020   What is the primary reason for your visit?: Other  Please describe your symptoms.: Left shoulder pain with external and internal rotation.  Have you had these symptoms before?: Yes  How long have you been having these symptoms?: Greater than 2 weeks  Please list any medications you are currently taking for this condition.: Meloxicam 7.5 mg PO QD  Please describe any probable cause for these symptoms. : Uncertain etiology. I have attributed it to possibly lifting 2 toddlers who weigh  approximately 30 pounds on a regular basis.

## 2020-08-08 RX ORDER — LIDOCAINE HYDROCHLORIDE 10 MG/ML
2 INJECTION, SOLUTION INFILTRATION; PERINEURAL
Status: COMPLETED | OUTPATIENT
Start: 2020-08-06 | End: 2020-08-06

## 2020-08-08 RX ORDER — TRIAMCINOLONE ACETONIDE 40 MG/ML
40 INJECTION, SUSPENSION INTRA-ARTICULAR; INTRAMUSCULAR
Status: COMPLETED | OUTPATIENT
Start: 2020-08-06 | End: 2020-08-06

## 2020-11-23 RX ORDER — PAROXETINE 10 MG/1
10 TABLET, FILM COATED ORAL EVERY MORNING
Qty: 30 TABLET | Refills: 11 | Status: SHIPPED | OUTPATIENT
Start: 2020-11-23 | End: 2021-04-29 | Stop reason: SDUPTHER

## 2020-12-07 ENCOUNTER — LAB (OUTPATIENT)
Dept: LAB | Facility: HOSPITAL | Age: 50
End: 2020-12-07

## 2020-12-07 DIAGNOSIS — R53.83 MALAISE AND FATIGUE: Primary | ICD-10-CM

## 2020-12-07 DIAGNOSIS — R53.81 MALAISE AND FATIGUE: Primary | ICD-10-CM

## 2020-12-07 PROCEDURE — 36415 COLL VENOUS BLD VENIPUNCTURE: CPT | Performed by: NURSE PRACTITIONER

## 2020-12-07 PROCEDURE — 86769 SARS-COV-2 COVID-19 ANTIBODY: CPT | Performed by: NURSE PRACTITIONER

## 2020-12-08 ENCOUNTER — TELEPHONE (OUTPATIENT)
Dept: FAMILY MEDICINE CLINIC | Facility: CLINIC | Age: 50
End: 2020-12-08

## 2020-12-08 LAB — SARS-COV-2 AB SERPL QL IA: NEGATIVE

## 2020-12-08 NOTE — PROGRESS NOTES
Per ONEIDA Garcia, Ms. Kent has been called with recent lab results & recommendations.  Continue current medications and follow-up as planned or sooner if any problems.

## 2020-12-08 NOTE — TELEPHONE ENCOUNTER
Per ONEIDA Garcia, Ms. Kent has been called with recent lab results & recommendations.  Continue current medications and follow-up as planned or sooner if any problems.      ----- Message from ONEIDA Davis sent at 12/8/2020  8:21 AM CST -----  Regarding: FW:  Can you let her know normal or negative antibodies  ----- Message -----  From: Lab, Background User  Sent: 12/8/2020   8:11 AM CST  To: ONEIDA Davis

## 2021-02-11 PROCEDURE — 87635 SARS-COV-2 COVID-19 AMP PRB: CPT | Performed by: EMERGENCY MEDICINE

## 2021-04-29 ENCOUNTER — OFFICE VISIT (OUTPATIENT)
Dept: FAMILY MEDICINE CLINIC | Facility: CLINIC | Age: 51
End: 2021-04-29

## 2021-04-29 ENCOUNTER — LAB (OUTPATIENT)
Dept: LAB | Facility: HOSPITAL | Age: 51
End: 2021-04-29

## 2021-04-29 VITALS
WEIGHT: 147 LBS | BODY MASS INDEX: 28.86 KG/M2 | SYSTOLIC BLOOD PRESSURE: 112 MMHG | DIASTOLIC BLOOD PRESSURE: 82 MMHG | TEMPERATURE: 98.7 F | HEIGHT: 60 IN

## 2021-04-29 DIAGNOSIS — Z12.31 VISIT FOR SCREENING MAMMOGRAM: ICD-10-CM

## 2021-04-29 DIAGNOSIS — Z12.11 SCREENING FOR COLON CANCER: ICD-10-CM

## 2021-04-29 DIAGNOSIS — Z00.00 WELLNESS EXAMINATION: Primary | ICD-10-CM

## 2021-04-29 LAB
25(OH)D3 SERPL-MCNC: 28.4 NG/ML (ref 30–100)
ALBUMIN SERPL-MCNC: 4.1 G/DL (ref 3.5–5.2)
ALBUMIN/GLOB SERPL: 1.3 G/DL
ALP SERPL-CCNC: 95 U/L (ref 39–117)
ALT SERPL W P-5'-P-CCNC: 32 U/L (ref 1–33)
ANION GAP SERPL CALCULATED.3IONS-SCNC: 11.5 MMOL/L (ref 5–15)
AST SERPL-CCNC: 23 U/L (ref 1–32)
BASOPHILS # BLD AUTO: 0.04 10*3/MM3 (ref 0–0.2)
BASOPHILS NFR BLD AUTO: 0.7 % (ref 0–1.5)
BILIRUB SERPL-MCNC: 0.8 MG/DL (ref 0–1.2)
BUN SERPL-MCNC: 13 MG/DL (ref 6–20)
BUN/CREAT SERPL: 15.1 (ref 7–25)
CALCIUM SPEC-SCNC: 9.1 MG/DL (ref 8.6–10.5)
CHLORIDE SERPL-SCNC: 106 MMOL/L (ref 98–107)
CHOLEST SERPL-MCNC: 175 MG/DL (ref 0–200)
CO2 SERPL-SCNC: 21.5 MMOL/L (ref 22–29)
CREAT SERPL-MCNC: 0.86 MG/DL (ref 0.57–1)
DEPRECATED RDW RBC AUTO: 42.2 FL (ref 37–54)
EOSINOPHIL # BLD AUTO: 0.09 10*3/MM3 (ref 0–0.4)
EOSINOPHIL NFR BLD AUTO: 1.5 % (ref 0.3–6.2)
ERYTHROCYTE [DISTWIDTH] IN BLOOD BY AUTOMATED COUNT: 13.1 % (ref 12.3–15.4)
GFR SERPL CREATININE-BSD FRML MDRD: 70 ML/MIN/1.73
GLOBULIN UR ELPH-MCNC: 3.1 GM/DL
GLUCOSE SERPL-MCNC: 83 MG/DL (ref 65–99)
HBA1C MFR BLD: 5.3 % (ref 4.8–5.6)
HCT VFR BLD AUTO: 40.3 % (ref 34–46.6)
HCV AB SER DONR QL: NORMAL
HDLC SERPL-MCNC: 58 MG/DL (ref 40–60)
HGB BLD-MCNC: 13.8 G/DL (ref 12–15.9)
IMM GRANULOCYTES # BLD AUTO: 0.03 10*3/MM3 (ref 0–0.05)
IMM GRANULOCYTES NFR BLD AUTO: 0.5 % (ref 0–0.5)
IRON 24H UR-MRATE: 90 MCG/DL (ref 37–145)
LDLC SERPL CALC-MCNC: 94 MG/DL (ref 0–100)
LDLC/HDLC SERPL: 1.56 {RATIO}
LYMPHOCYTES # BLD AUTO: 1.98 10*3/MM3 (ref 0.7–3.1)
LYMPHOCYTES NFR BLD AUTO: 33.4 % (ref 19.6–45.3)
MCH RBC QN AUTO: 30.5 PG (ref 26.6–33)
MCHC RBC AUTO-ENTMCNC: 34.2 G/DL (ref 31.5–35.7)
MCV RBC AUTO: 89.2 FL (ref 79–97)
MONOCYTES # BLD AUTO: 0.59 10*3/MM3 (ref 0.1–0.9)
MONOCYTES NFR BLD AUTO: 9.9 % (ref 5–12)
NEUTROPHILS NFR BLD AUTO: 3.2 10*3/MM3 (ref 1.7–7)
NEUTROPHILS NFR BLD AUTO: 54 % (ref 42.7–76)
NRBC BLD AUTO-RTO: 0 /100 WBC (ref 0–0.2)
PLATELET # BLD AUTO: 258 10*3/MM3 (ref 140–450)
PMV BLD AUTO: 11 FL (ref 6–12)
POTASSIUM SERPL-SCNC: 4.1 MMOL/L (ref 3.5–5.2)
PROT SERPL-MCNC: 7.2 G/DL (ref 6–8.5)
RBC # BLD AUTO: 4.52 10*6/MM3 (ref 3.77–5.28)
SODIUM SERPL-SCNC: 139 MMOL/L (ref 136–145)
TRIGL SERPL-MCNC: 132 MG/DL (ref 0–150)
TSH SERPL DL<=0.05 MIU/L-ACNC: 3.82 UIU/ML (ref 0.27–4.2)
VIT B12 BLD-MCNC: 743 PG/ML (ref 211–946)
VLDLC SERPL-MCNC: 23 MG/DL (ref 5–40)
WBC # BLD AUTO: 5.93 10*3/MM3 (ref 3.4–10.8)

## 2021-04-29 PROCEDURE — 86803 HEPATITIS C AB TEST: CPT | Performed by: NURSE PRACTITIONER

## 2021-04-29 PROCEDURE — 85025 COMPLETE CBC W/AUTO DIFF WBC: CPT | Performed by: NURSE PRACTITIONER

## 2021-04-29 PROCEDURE — 80053 COMPREHEN METABOLIC PANEL: CPT | Performed by: NURSE PRACTITIONER

## 2021-04-29 PROCEDURE — 83036 HEMOGLOBIN GLYCOSYLATED A1C: CPT | Performed by: NURSE PRACTITIONER

## 2021-04-29 PROCEDURE — 99396 PREV VISIT EST AGE 40-64: CPT | Performed by: NURSE PRACTITIONER

## 2021-04-29 PROCEDURE — 82306 VITAMIN D 25 HYDROXY: CPT | Performed by: NURSE PRACTITIONER

## 2021-04-29 PROCEDURE — 82607 VITAMIN B-12: CPT | Performed by: NURSE PRACTITIONER

## 2021-04-29 PROCEDURE — 83540 ASSAY OF IRON: CPT | Performed by: NURSE PRACTITIONER

## 2021-04-29 PROCEDURE — 80061 LIPID PANEL: CPT | Performed by: NURSE PRACTITIONER

## 2021-04-29 PROCEDURE — 36415 COLL VENOUS BLD VENIPUNCTURE: CPT | Performed by: NURSE PRACTITIONER

## 2021-04-29 PROCEDURE — 84443 ASSAY THYROID STIM HORMONE: CPT | Performed by: NURSE PRACTITIONER

## 2021-04-29 RX ORDER — CEFPROZIL 500 MG/1
500 TABLET, FILM COATED ORAL 2 TIMES DAILY
COMMUNITY
End: 2022-05-02

## 2021-04-29 RX ORDER — PAROXETINE 10 MG/1
10 TABLET, FILM COATED ORAL EVERY MORNING
Qty: 90 TABLET | Refills: 3 | Status: SHIPPED | OUTPATIENT
Start: 2021-04-29 | End: 2022-02-17 | Stop reason: SDUPTHER

## 2021-04-29 RX ORDER — MVIT,CALC,MIN/FOLIC AC/HRB176 200 MCG
TABLET ORAL
COMMUNITY
End: 2022-11-18

## 2021-04-29 RX ORDER — MULTIPLE VITAMINS W/ MINERALS TAB 9MG-400MCG
TAB ORAL
COMMUNITY
Start: 2021-03-01 | End: 2022-11-18

## 2021-04-29 RX ORDER — UBIDECARENONE 100 MG
100 CAPSULE ORAL DAILY
COMMUNITY
Start: 2021-03-01 | End: 2022-11-18

## 2021-04-29 NOTE — PROGRESS NOTES
"  Chief Complaint   Patient presents with   • Wellness Exam     mammograms , labs     Subjective   Karena Kent is a 51 y.o. female.     General exam for health care needs -no major complaints -needs mammogram -needs refills of meds paxil         The following portions of the patient's history were reviewed and updated as appropriate: allergies, current medications, past social history and problem list.    Review of Systems   Constitutional: Positive for fatigue. Negative for activity change, appetite change, diaphoresis and unexpected weight change.   Eyes: Negative.  Negative for photophobia, pain, discharge, redness, itching and visual disturbance.   Respiratory: Negative for apnea, choking, chest tightness and stridor.    Cardiovascular: Negative.  Negative for palpitations and leg swelling.   Gastrointestinal: Negative.  Negative for abdominal distention, anal bleeding and blood in stool.   Endocrine: Negative.  Negative for cold intolerance, heat intolerance, polydipsia, polyphagia and polyuria.   Genitourinary: Negative.  Negative for difficulty urinating, dyspareunia, vaginal bleeding, vaginal discharge and vaginal pain.        Hot flashes but paxil helps    Musculoskeletal: Negative.  Negative for arthralgias, back pain, gait problem and joint swelling.   Skin: Negative.    Allergic/Immunologic: Negative.  Negative for food allergies and immunocompromised state.   Neurological: Negative.  Negative for dizziness, tremors, seizures, syncope, facial asymmetry, speech difficulty, weakness, light-headedness, numbness and headaches.   Hematological: Negative.  Negative for adenopathy. Does not bruise/bleed easily.   Psychiatric/Behavioral: Negative.  Negative for agitation, behavioral problems, confusion, decreased concentration and dysphoric mood.   All other systems reviewed and are negative.      Objective   /82   Temp 98.7 °F (37.1 °C) (Tympanic)   Ht 152.4 cm (60\")   Wt 66.7 kg (147 lb)   " LMP  (LMP Unknown)   BMI 28.71 kg/m²   Physical Exam  Vitals and nursing note reviewed.   HENT:      Head: Normocephalic.      Right Ear: Tympanic membrane normal.      Nose: Nose normal.      Mouth/Throat:      Mouth: Mucous membranes are moist.   Eyes:      Pupils: Pupils are equal, round, and reactive to light.   Cardiovascular:      Rate and Rhythm: Normal rate.      Pulses: Normal pulses.   Pulmonary:      Effort: Pulmonary effort is normal.   Abdominal:      General: Abdomen is flat.   Musculoskeletal:         General: Normal range of motion.      Cervical back: Normal range of motion and neck supple.   Skin:     General: Skin is warm.   Neurological:      General: No focal deficit present.      Mental Status: She is alert and oriented to person, place, and time.         Assessment/Plan   Problems Addressed this Visit        Health Encounters    Wellness examination - Primary    Relevant Orders    CBC & Differential    Comprehensive Metabolic Panel    Hemoglobin A1c    Iron    Lipid Panel    Vitamin B12    Vitamin D 25 Hydroxy    TSH    Hepatitis C Antibody      Other Visit Diagnoses     Visit for screening mammogram        Relevant Orders    Mammo Screening Digital Tomosynthesis Bilateral With CAD    CBC & Differential    Comprehensive Metabolic Panel    Hemoglobin A1c    Iron    Lipid Panel    Vitamin B12    Vitamin D 25 Hydroxy    TSH    Screening for colon cancer        Relevant Orders    Cologuard - Stool, Per Rectum      Diagnoses       Codes Comments    Wellness examination    -  Primary ICD-10-CM: Z00.00  ICD-9-CM: V70.0     Visit for screening mammogram     ICD-10-CM: Z12.31  ICD-9-CM: V76.12     Screening for colon cancer     ICD-10-CM: Z12.11  ICD-9-CM: V76.51            New Medications Ordered This Visit   Medications   • PARoxetine (PAXIL) 10 MG tablet     Sig: Take 1 tablet by mouth Every Morning.     Dispense:  90 tablet     Refill:  3       It's not just what you eat, but when you eat  Eat  breakfast, and eat smaller meals throughout the day. A healthy breakfast can jumpstart your metabolism, while eating small, healthy meals (rather than the standard three large meals) keeps your energy up.   Avoid eating at night. Try to eat dinner earlier and fast for 14-16 hours until breakfast the next morning. Studies suggest that eating only when you’re most active and giving your digestive system a long break each day may help to regulate weight.     cologaurd ordered, mammogram ordered, labs fasting -refill meds as directed

## 2021-04-30 ENCOUNTER — TELEPHONE (OUTPATIENT)
Dept: FAMILY MEDICINE CLINIC | Facility: CLINIC | Age: 51
End: 2021-04-30

## 2021-04-30 NOTE — TELEPHONE ENCOUNTER
Per ONEIDA Dempsey, Ms. Kent has been called with recent lab results & recommendations.  Continue current medications and follow-up as planned or sooner if any problems.         ----- Message from ONEIDA Davis sent at 4/30/2021  7:45 AM CDT -----  Can you let her know she needs vitamin d 19757 iu weekly otherwise good results

## 2021-04-30 NOTE — PROGRESS NOTES
Per ONEIDA Dempsey, Ms. Kent has been called with recent lab results & recommendations.  Continue current medications and follow-up as planned or sooner if any problems.

## 2021-04-30 NOTE — PROGRESS NOTES
Per Roselyn, Ms. Kent has been called with recent Bilateral Screening 3-D Mammogram results & recommendations.  Continue current medications and follow-up as planned or sooner if any problems.

## 2021-04-30 NOTE — TELEPHONE ENCOUNTER
-Per Roselyn, Ms. Kent has been called with recent Bilateral Screening 3-D Mammogram results & recommendations.  Continue current medications and follow-up as planned or sooner if any problems.         ---- Message from ONEIDA Davis sent at 4/30/2021 10:05 AM CDT -----  Can you let her know normal

## 2021-05-07 ENCOUNTER — TELEPHONE (OUTPATIENT)
Dept: FAMILY MEDICINE CLINIC | Facility: CLINIC | Age: 51
End: 2021-05-07

## 2021-05-07 RX ORDER — ERGOCALCIFEROL 1.25 MG/1
50000 CAPSULE ORAL WEEKLY
Qty: 5 CAPSULE | Refills: 11 | Status: SHIPPED | OUTPATIENT
Start: 2021-05-07 | End: 2022-05-02

## 2021-05-07 RX ORDER — ERGOCALCIFEROL 1.25 MG/1
50000 CAPSULE ORAL WEEKLY
Qty: 4 CAPSULE | Refills: 5 | Status: CANCELLED | OUTPATIENT
Start: 2021-05-07

## 2021-05-07 NOTE — TELEPHONE ENCOUNTER
Per ONEIDA Dempsey, Ms. Kent has been called and advised that her script has been sent to [facility]

## 2021-05-07 NOTE — TELEPHONE ENCOUNTER
Roselyn, here is your note from 04/30/2021  Can you let her know she needs vitamin d 70971 iu weekly otherwise good results     Can you please send to [facility], Script is ready for you to send    ----- Message from Karena Kent sent at 5/7/2021  1:23 PM CDT -----  Regarding: Prescription Question  Contact: [phone]  When Ursula called about my results, she indicated that a weekly Vitamin D 50,000 unit supplement had called in, but I went to pick it up at the pharmacy, and they did not have it.

## 2022-02-17 RX ORDER — PAROXETINE 10 MG/1
10 TABLET, FILM COATED ORAL EVERY MORNING
Qty: 90 TABLET | Refills: 3 | Status: SHIPPED | OUTPATIENT
Start: 2022-02-17 | End: 2022-12-07

## 2022-02-17 NOTE — TELEPHONE ENCOUNTER
Incoming Refill Request      Medication requested (name and dose): PAROXETINE     Pharmacy where request should be sent: [facility]     Additional details provided by patient:     Best call back number:     Does the patient have less than a 3 day supply:  [] Yes  [] No    Shaji Hernandez Rep  02/17/22, 09:57 CST

## 2022-05-02 ENCOUNTER — LAB (OUTPATIENT)
Dept: LAB | Facility: HOSPITAL | Age: 52
End: 2022-05-02

## 2022-05-02 ENCOUNTER — OFFICE VISIT (OUTPATIENT)
Dept: FAMILY MEDICINE CLINIC | Facility: CLINIC | Age: 52
End: 2022-05-02

## 2022-05-02 VITALS
SYSTOLIC BLOOD PRESSURE: 110 MMHG | OXYGEN SATURATION: 98 % | WEIGHT: 158 LBS | HEART RATE: 74 BPM | BODY MASS INDEX: 31.02 KG/M2 | HEIGHT: 60 IN | DIASTOLIC BLOOD PRESSURE: 82 MMHG

## 2022-05-02 DIAGNOSIS — Z01.419 GYNECOLOGIC EXAM NORMAL: Primary | ICD-10-CM

## 2022-05-02 DIAGNOSIS — Z12.31 VISIT FOR SCREENING MAMMOGRAM: ICD-10-CM

## 2022-05-02 LAB
25(OH)D3 SERPL-MCNC: 34.7 NG/ML (ref 30–100)
ALBUMIN SERPL-MCNC: 4.4 G/DL (ref 3.5–5.2)
ALBUMIN/GLOB SERPL: 1.6 G/DL
ALP SERPL-CCNC: 81 U/L (ref 39–117)
ALT SERPL W P-5'-P-CCNC: 21 U/L (ref 1–33)
ANION GAP SERPL CALCULATED.3IONS-SCNC: 11.1 MMOL/L (ref 5–15)
AST SERPL-CCNC: 23 U/L (ref 1–32)
BASOPHILS # BLD AUTO: 0.02 10*3/MM3 (ref 0–0.2)
BASOPHILS NFR BLD AUTO: 0.4 % (ref 0–1.5)
BILIRUB SERPL-MCNC: 0.9 MG/DL (ref 0–1.2)
BUN SERPL-MCNC: 16 MG/DL (ref 6–20)
BUN/CREAT SERPL: 16.7 (ref 7–25)
CALCIUM SPEC-SCNC: 9.2 MG/DL (ref 8.6–10.5)
CHLORIDE SERPL-SCNC: 104 MMOL/L (ref 98–107)
CHOLEST SERPL-MCNC: 187 MG/DL (ref 0–200)
CO2 SERPL-SCNC: 22.9 MMOL/L (ref 22–29)
CREAT SERPL-MCNC: 0.96 MG/DL (ref 0.57–1)
DEPRECATED RDW RBC AUTO: 43 FL (ref 37–54)
EGFRCR SERPLBLD CKD-EPI 2021: 72.2 ML/MIN/1.73
EOSINOPHIL # BLD AUTO: 0.07 10*3/MM3 (ref 0–0.4)
EOSINOPHIL NFR BLD AUTO: 1.4 % (ref 0.3–6.2)
ERYTHROCYTE [DISTWIDTH] IN BLOOD BY AUTOMATED COUNT: 13.4 % (ref 12.3–15.4)
GLOBULIN UR ELPH-MCNC: 2.8 GM/DL
GLUCOSE SERPL-MCNC: 90 MG/DL (ref 65–99)
HCT VFR BLD AUTO: 41.3 % (ref 34–46.6)
HDLC SERPL-MCNC: 68 MG/DL (ref 40–60)
HGB BLD-MCNC: 14 G/DL (ref 12–15.9)
IMM GRANULOCYTES # BLD AUTO: 0.01 10*3/MM3 (ref 0–0.05)
IMM GRANULOCYTES NFR BLD AUTO: 0.2 % (ref 0–0.5)
LDLC SERPL CALC-MCNC: 102 MG/DL (ref 0–100)
LDLC/HDLC SERPL: 1.47 {RATIO}
LYMPHOCYTES # BLD AUTO: 1.77 10*3/MM3 (ref 0.7–3.1)
LYMPHOCYTES NFR BLD AUTO: 34.3 % (ref 19.6–45.3)
MCH RBC QN AUTO: 30 PG (ref 26.6–33)
MCHC RBC AUTO-ENTMCNC: 33.9 G/DL (ref 31.5–35.7)
MCV RBC AUTO: 88.4 FL (ref 79–97)
MONOCYTES # BLD AUTO: 0.41 10*3/MM3 (ref 0.1–0.9)
MONOCYTES NFR BLD AUTO: 7.9 % (ref 5–12)
NEUTROPHILS NFR BLD AUTO: 2.88 10*3/MM3 (ref 1.7–7)
NEUTROPHILS NFR BLD AUTO: 55.8 % (ref 42.7–76)
NRBC BLD AUTO-RTO: 0 /100 WBC (ref 0–0.2)
PLATELET # BLD AUTO: 215 10*3/MM3 (ref 140–450)
PMV BLD AUTO: 11.4 FL (ref 6–12)
POTASSIUM SERPL-SCNC: 3.8 MMOL/L (ref 3.5–5.2)
PROT SERPL-MCNC: 7.2 G/DL (ref 6–8.5)
RBC # BLD AUTO: 4.67 10*6/MM3 (ref 3.77–5.28)
SODIUM SERPL-SCNC: 138 MMOL/L (ref 136–145)
TRIGL SERPL-MCNC: 94 MG/DL (ref 0–150)
TSH SERPL DL<=0.05 MIU/L-ACNC: 3.86 UIU/ML (ref 0.27–4.2)
VLDLC SERPL-MCNC: 17 MG/DL (ref 5–40)
WBC NRBC COR # BLD: 5.16 10*3/MM3 (ref 3.4–10.8)

## 2022-05-02 PROCEDURE — 82306 VITAMIN D 25 HYDROXY: CPT | Performed by: NURSE PRACTITIONER

## 2022-05-02 PROCEDURE — 99396 PREV VISIT EST AGE 40-64: CPT | Performed by: NURSE PRACTITIONER

## 2022-05-02 PROCEDURE — 36415 COLL VENOUS BLD VENIPUNCTURE: CPT | Performed by: NURSE PRACTITIONER

## 2022-05-02 PROCEDURE — 80050 GENERAL HEALTH PANEL: CPT | Performed by: NURSE PRACTITIONER

## 2022-05-02 PROCEDURE — 80061 LIPID PANEL: CPT | Performed by: NURSE PRACTITIONER

## 2022-05-02 NOTE — PROGRESS NOTES
Chief Complaint   Patient presents with   • Wellness exam     Mammogram and Pap smear     Subjective   Karena Kent is a 52 y.o. female.            General exam for health care needs -no major complaints -needs mammogram -takes paxil daily otherwise doing well     Gynecologic Exam  The patient's pertinent negatives include no vaginal discharge. The problem has been resolved. The patient is experiencing no pain. She is not pregnant. Pertinent negatives include no abdominal pain, back pain, constipation, diarrhea, dysuria, fever, flank pain, frequency, headaches, joint swelling, nausea, painful intercourse or rash. She is sexually active. There is no history of an abdominal surgery, a  section, an ectopic pregnancy, endometriosis, a gynecological surgery, herpes simplex, menorrhagia, miscarriage, ovarian cysts, perineal abscess, PID, an STD or a terminated pregnancy.        The following portions of the patient's history were reviewed and updated as appropriate: allergies, current medications, past social history and problem list.    Review of Systems   Constitutional: Positive for fatigue. Negative for activity change, appetite change, diaphoresis, fever and unexpected weight change.   HENT: Negative for ear pain, facial swelling, hearing loss, mouth sores, nosebleeds, postnasal drip and rhinorrhea.    Eyes: Negative.  Negative for photophobia, pain, discharge, redness, itching and visual disturbance.   Respiratory: Negative for apnea, choking, chest tightness, shortness of breath, wheezing and stridor.    Cardiovascular: Negative.  Negative for palpitations and leg swelling.   Gastrointestinal: Negative.  Negative for abdominal distention, abdominal pain, anal bleeding, blood in stool, constipation, diarrhea and nausea.   Endocrine: Negative.  Negative for cold intolerance, heat intolerance, polydipsia, polyphagia and polyuria.   Genitourinary: Negative.  Negative for difficulty urinating,  "dyspareunia, dysuria, flank pain, frequency, menorrhagia, vaginal bleeding, vaginal discharge and vaginal pain.        Hot flashes but paxil helps    Musculoskeletal: Negative.  Negative for arthralgias, back pain, gait problem, joint swelling, neck pain and neck stiffness.   Skin: Negative for rash.   Allergic/Immunologic: Negative.  Negative for food allergies and immunocompromised state.   Neurological: Negative.  Negative for dizziness, tremors, seizures, syncope, facial asymmetry, speech difficulty, weakness, light-headedness, numbness and headaches.   Hematological: Negative.  Negative for adenopathy. Does not bruise/bleed easily.   Psychiatric/Behavioral: Negative.  Negative for agitation, behavioral problems, confusion, decreased concentration, dysphoric mood and hallucinations. The patient is not nervous/anxious and is not hyperactive.    All other systems reviewed and are negative.      Objective   /82   Pulse 74   Ht 152.4 cm (60\")   Wt 71.7 kg (158 lb)   LMP  (LMP Unknown)   SpO2 98%   BMI 30.86 kg/m²   Physical Exam  Vitals and nursing note reviewed.   Constitutional:       General: She is not in acute distress.     Appearance: Normal appearance. She is not ill-appearing, toxic-appearing or diaphoretic.   HENT:      Head: Normocephalic.      Right Ear: Tympanic membrane normal. There is no impacted cerumen.      Left Ear: There is no impacted cerumen.      Nose: Nose normal.      Mouth/Throat:      Mouth: Mucous membranes are moist.   Eyes:      Pupils: Pupils are equal, round, and reactive to light.   Cardiovascular:      Rate and Rhythm: Normal rate and regular rhythm.      Pulses: Normal pulses.      Heart sounds: No murmur heard.    No friction rub. No gallop.   Pulmonary:      Effort: Pulmonary effort is normal. No respiratory distress.      Breath sounds: No stridor. No wheezing, rhonchi or rales.   Chest:      Chest wall: No tenderness.   Breasts:      Right: Normal. No swelling, " bleeding, inverted nipple, axillary adenopathy or supraclavicular adenopathy.      Left: Normal. No swelling, bleeding, inverted nipple or axillary adenopathy.       Abdominal:      General: Abdomen is flat. There is no distension.      Palpations: There is no mass.      Tenderness: There is no abdominal tenderness.      Hernia: No hernia is present.   Genitourinary:     Uterus: Normal.       Adnexa: Right adnexa normal and left adnexa normal.      Rectum: Normal. Guaiac result negative. No mass, tenderness, anal fissure, external hemorrhoid or internal hemorrhoid. Normal anal tone.   Musculoskeletal:         General: Normal range of motion.      Cervical back: Normal range of motion and neck supple.   Lymphadenopathy:      Upper Body:      Right upper body: No supraclavicular, axillary or pectoral adenopathy.      Left upper body: No axillary or pectoral adenopathy.   Skin:     General: Skin is warm.      Coloration: Skin is not jaundiced or pale.      Findings: No bruising, erythema, lesion or rash.   Neurological:      General: No focal deficit present.      Mental Status: She is alert and oriented to person, place, and time.      Cranial Nerves: No cranial nerve deficit.      Sensory: No sensory deficit.      Motor: No weakness.      Coordination: Coordination normal.   Psychiatric:         Mood and Affect: Mood normal.         Behavior: Behavior normal.              Assessment/Plan     Problems Addressed this Visit    None     Visit Diagnoses     Gynecologic exam normal    -  Primary    Relevant Orders    Liquid-based Pap Smear, Screening    CBC & Differential    Comprehensive Metabolic Panel    Lipid Panel    TSH    Vitamin D 25 Hydroxy    Visit for screening mammogram        Relevant Orders    Mammo Screening Digital Tomosynthesis Bilateral With CAD      Diagnoses       Codes Comments    Gynecologic exam normal    -  Primary ICD-10-CM: Z01.419  ICD-9-CM: V72.31     Visit for screening mammogram     ICD-10-CM:  Z12.31  ICD-9-CM: V76.12          No orders of the defined types were placed in this encounter.    Current Outpatient Medications on File Prior to Visit   Medication Sig Dispense Refill   • coenzyme Q10 100 MG capsule Take 100 mg by mouth Daily.     • COLLAGEN PO Take  by mouth.     • ibuprofen (ADVIL,MOTRIN) 400 MG tablet Take 400 mg by mouth Every 6 (Six) Hours As Needed for Mild Pain .     • LACTOBACILLUS RHAMNOSUS, GG, PO      • Multiple Vitamins-Minerals (MULTIVITAMIN ADULT PO) Take  by mouth.     • multivitamin with minerals tablet tablet      • Nutritional Supplements (Estro Support ES) tablet Take  by mouth. EstroG-100     • PARoxetine (PAXIL) 10 MG tablet Take 1 tablet by mouth Every Morning. 90 tablet 3   • [DISCONTINUED] vitamin D (ERGOCALCIFEROL) 1.25 MG (60225 UT) capsule capsule Take 1 capsule by mouth 1 (One) Time Per Week. 5 capsule 11   • [DISCONTINUED] cefprozil (CEFZIL) 500 MG tablet Take 500 mg by mouth 2 (Two) Times a Day. Started on sun 04/25/21       No current facility-administered medications on file prior to visit.       20 minutes   Follow Up   No follow-ups on file.        It's not just what you eat, but when you eat  Eat breakfast, and eat smaller meals throughout the day. A healthy breakfast can jumpstart your metabolism, while eating small, healthy meals (rather than the standard three large meals) keeps your energy up.   Avoid eating at night. Try to eat dinner earlier and fast for 14-16 hours until breakfast the next morning. Studies suggest that eating only when you’re most active and giving your digestive system a long break each day may help to regulate weight.     Taking vitamin d otc     Mammogram as directed-pap today, labs today

## 2022-05-03 ENCOUNTER — TELEPHONE (OUTPATIENT)
Dept: FAMILY MEDICINE CLINIC | Facility: CLINIC | Age: 52
End: 2022-05-03

## 2022-05-03 NOTE — PROGRESS NOTES
Per ONEIDA Dempsey, Ms. Kent has been called with recent Screening Mammogram results & recommendations.  Continue current medications and follow-up as planned or sooner if any problems.

## 2022-05-03 NOTE — TELEPHONE ENCOUNTER
Per ONEIDA Dempsey, Ms. Kent has been called with recent Screening Mammogram results & recommendations.  Continue current medications and follow-up as planned or sooner if any problems.       ----- Message from ONEIDA Davis sent at 5/3/2022 12:31 PM CDT -----  Regarding: FW:  Can you let her know normal  ----- Message -----  From: Pocketbook, Rad Results [place] In  Sent: 5/3/2022   9:37 AM CDT  To: ONEIDA Davis

## 2022-05-03 NOTE — TELEPHONE ENCOUNTER
Per ONEIDA Dempsey, Ms. Kent has been called with recent Lab results & recommendations.  Continue current medications and follow-up as planned or sooner if any problems.         ----- Message from ONEIDA Davis sent at 5/3/2022 12:43 PM CDT -----  Regarding: FW:  Can you let her know labs look great. No changes needed  ----- Message -----  From: Lab, Background User  Sent: 5/2/2022   6:41 PM CDT  To: ONEIDA Davis

## 2022-05-06 LAB
LAB AP CASE REPORT: NORMAL
PATH INTERP SPEC-IMP: NORMAL

## 2022-11-18 ENCOUNTER — OFFICE VISIT (OUTPATIENT)
Dept: FAMILY MEDICINE CLINIC | Facility: CLINIC | Age: 52
End: 2022-11-18

## 2022-11-18 VITALS
WEIGHT: 159 LBS | RESPIRATION RATE: 22 BRPM | SYSTOLIC BLOOD PRESSURE: 124 MMHG | HEART RATE: 78 BPM | HEIGHT: 60 IN | TEMPERATURE: 98.9 F | OXYGEN SATURATION: 99 % | DIASTOLIC BLOOD PRESSURE: 66 MMHG | BODY MASS INDEX: 31.22 KG/M2

## 2022-11-18 DIAGNOSIS — G44.89 OTHER HEADACHE SYNDROME: ICD-10-CM

## 2022-11-18 DIAGNOSIS — J01.10 ACUTE NON-RECURRENT FRONTAL SINUSITIS: Primary | ICD-10-CM

## 2022-11-18 PROBLEM — R53.81 MALAISE: Status: RESOLVED | Noted: 2017-09-13 | Resolved: 2022-11-18

## 2022-11-18 LAB
EXPIRATION DATE: NORMAL
FLUAV AG UPPER RESP QL IA.RAPID: NOT DETECTED
FLUBV AG UPPER RESP QL IA.RAPID: NOT DETECTED
INTERNAL CONTROL: NORMAL
Lab: NORMAL
SARS-COV-2 AG UPPER RESP QL IA.RAPID: NOT DETECTED

## 2022-11-18 PROCEDURE — 87428 SARSCOV & INF VIR A&B AG IA: CPT | Performed by: NURSE PRACTITIONER

## 2022-11-18 PROCEDURE — 96372 THER/PROPH/DIAG INJ SC/IM: CPT | Performed by: NURSE PRACTITIONER

## 2022-11-18 PROCEDURE — 99214 OFFICE O/P EST MOD 30 MIN: CPT | Performed by: NURSE PRACTITIONER

## 2022-11-18 RX ORDER — TRIAMCINOLONE ACETONIDE 40 MG/ML
80 INJECTION, SUSPENSION INTRA-ARTICULAR; INTRAMUSCULAR ONCE
Status: COMPLETED | OUTPATIENT
Start: 2022-11-18 | End: 2022-11-18

## 2022-11-18 RX ORDER — AMOXICILLIN AND CLAVULANATE POTASSIUM 875; 125 MG/1; MG/1
1 TABLET, FILM COATED ORAL 2 TIMES DAILY
Qty: 14 TABLET | Refills: 0 | Status: SHIPPED | OUTPATIENT
Start: 2022-11-18 | End: 2022-12-07

## 2022-11-18 RX ADMIN — TRIAMCINOLONE ACETONIDE 80 MG: 40 INJECTION, SUSPENSION INTRA-ARTICULAR; INTRAMUSCULAR at 10:27

## 2022-11-18 NOTE — PROGRESS NOTES
"Chief Complaint  Headache    Subjective          Karena Kent presents to [facility] [place]  With sinus congestion and sinus headache. She started getting sick in October and is seeming to get better other than the sinus pressure and headache.         Sinusitis  This is a new problem. The current episode started more than 1 month ago. The problem has been waxing and waning since onset. There has been no fever. The pain is mild. Associated symptoms include headaches and sinus pressure. Past treatments include oral decongestants. The treatment provided no relief.     Outpatient Medications Prior to Visit   Medication Sig Dispense Refill   • COLLAGEN PO Take  by mouth.     • ibuprofen (ADVIL,MOTRIN) 400 MG tablet Take 400 mg by mouth Every 6 (Six) Hours As Needed for Mild Pain .     • LACTOBACILLUS RHAMNOSUS, GG, PO      • Multiple Vitamins-Minerals (MULTIVITAMIN ADULT PO) Take  by mouth.     • PARoxetine (PAXIL) 10 MG tablet Take 1 tablet by mouth Every Morning. 90 tablet 3   • coenzyme Q10 100 MG capsule Take 100 mg by mouth Daily.     • multivitamin with minerals tablet tablet      • Nutritional Supplements (Estro Support ES) tablet Take  by mouth. EstroG-100       No facility-administered medications prior to visit.       Review of Systems   HENT: Positive for sinus pressure.    Neurological: Positive for headaches.         Objective   Vital Signs:   Visit Vitals  /66 (BP Location: Left arm, Patient Position: Sitting, Cuff Size: Large Adult)   Pulse 78   Temp 98.9 °F (37.2 °C) (Tympanic)   Resp 22   Ht 152.4 cm (60\")   Wt 72.1 kg (159 lb)   LMP  (LMP Unknown)   SpO2 99%   BMI 31.05 kg/m²     Physical Exam  Vitals and nursing note reviewed.   Constitutional:       Appearance: She is well-developed.   HENT:      Head: Normocephalic and atraumatic.      Nose: Congestion present.      Right Sinus: Frontal sinus tenderness present.      Left Sinus: Frontal " sinus tenderness present.   Eyes:      General: Lids are normal.      Conjunctiva/sclera: Conjunctivae normal.   Neck:      Thyroid: No thyroid mass or thyromegaly.      Trachea: Trachea normal. No tracheal tenderness.   Cardiovascular:      Rate and Rhythm: Normal rate.      Pulses: Normal pulses.      Heart sounds: Normal heart sounds.   Pulmonary:      Effort: Pulmonary effort is normal. No respiratory distress.      Breath sounds: Normal breath sounds. No wheezing.   Abdominal:      General: There is no distension.      Palpations: Abdomen is soft. There is no mass.   Musculoskeletal:         General: Normal range of motion.      Cervical back: Normal range of motion. No edema.   Skin:     General: Skin is warm and dry.      Coloration: Skin is not pale.      Findings: No abrasion, erythema or lesion.   Neurological:      Mental Status: She is alert and oriented to person, place, and time.   Psychiatric:         Mood and Affect: Mood is not anxious. Affect is not inappropriate.         Speech: Speech normal.         Behavior: Behavior normal.         Thought Content: Thought content normal.         Judgment: Judgment normal. Judgment is not impulsive.        Result Review :                 Assessment and Plan {CC Problem List  Visit Diagnosis  ROS  Review (Popup)  [facility] Maintenance  Quality  BestPractice  Medications  SmartSets  SnapShot Encounters  Media :23}   Diagnoses and all orders for this visit:    1. Acute non-recurrent frontal sinusitis (Primary)  -     amoxicillin-clavulanate (Augmentin) 875-125 MG per tablet; Take 1 tablet by mouth 2 (Two) Times a Day.  Dispense: 14 tablet; Refill: 0  -     triamcinolone acetonide (KENALOG-40) injection 80 mg    2. Other headache syndrome  -     POCT SARS-CoV-2 Antigen MARK + Flu  -     amoxicillin-clavulanate (Augmentin) 875-125 MG per tablet; Take 1 tablet by mouth 2 (Two) Times a Day.  Dispense: 14 tablet; Refill: 0  -     triamcinolone acetonide  (KENALOG-40) injection 80 mg         Kenalog injection today in the office    Start prescribed medications     POC testing negative in the office    Continue to increase hydration, may use mucinex as well     Please call the office if you have any issues or no improvement         Follow Up   Return if symptoms worsen or fail to improve, for Next scheduled follow up.  Patient was given instructions and counseling regarding her condition or for health maintenance advice. Please see specific information pulled into the AVS if appropriate.           This document has been electronically signed by ONEIDA Estevez on November 22, 2022 09:36 CST

## 2022-12-07 ENCOUNTER — OFFICE VISIT (OUTPATIENT)
Dept: OBSTETRICS AND GYNECOLOGY | Facility: CLINIC | Age: 52
End: 2022-12-07

## 2022-12-07 VITALS
DIASTOLIC BLOOD PRESSURE: 74 MMHG | HEIGHT: 60 IN | WEIGHT: 158 LBS | BODY MASS INDEX: 31.02 KG/M2 | SYSTOLIC BLOOD PRESSURE: 112 MMHG

## 2022-12-07 DIAGNOSIS — N95.1 VAGINAL DRYNESS, MENOPAUSAL: ICD-10-CM

## 2022-12-07 DIAGNOSIS — N95.1 VASOMOTOR SYMPTOMS DUE TO MENOPAUSE: Primary | ICD-10-CM

## 2022-12-07 DIAGNOSIS — N39.41 URGE INCONTINENCE OF URINE: ICD-10-CM

## 2022-12-07 PROCEDURE — 99214 OFFICE O/P EST MOD 30 MIN: CPT

## 2022-12-07 RX ORDER — VENLAFAXINE HYDROCHLORIDE 37.5 MG/1
37.5 CAPSULE, EXTENDED RELEASE ORAL DAILY
Qty: 30 CAPSULE | Refills: 4 | Status: SHIPPED | OUTPATIENT
Start: 2022-12-07 | End: 2023-12-07

## 2022-12-07 RX ORDER — CONJUGATED ESTROGENS 0.62 MG/G
CREAM VAGINAL
Qty: 30 G | Refills: 3 | Status: SHIPPED | OUTPATIENT
Start: 2022-12-07 | End: 2023-03-07

## 2022-12-07 NOTE — PROGRESS NOTES
Subjective   Karena Kent is a 52 y.o. Menopausal Issues    History of Present Illness  LMP: Ablation   Pap: 5/2/2022 ASCUS, neg HPV  Mammo: 5/2/2022    Patient presents today for menopausal issues. Patient reports that she has been having menopausal symptoms since 2018, her PCP has been treating her. Initially she was started on Paxil, however, she feels this is not working any more. She takes multiple OTC menopausal supplements, probiotics and multivitamins, to help with her symptoms. These have not proven to help much. She reports bloating, vaginal dryness, no sex drive, urge incontinence and hot flashes. Reports family history of maternal grandmother with breast cancer and grandmothers sister. She is unsure of when they were diagnosed.       Gynecologic Exam  The patient's pertinent negatives include no genital itching, genital lesions, genital odor, genital rash, pelvic pain, vaginal bleeding or vaginal discharge. This is a new problem. The current episode started more than 1 year ago. The problem occurs intermittently. The problem has been waxing and waning. Pertinent negatives include no abdominal pain, back pain, chills, constipation, diarrhea, dysuria, fever, flank pain, frequency, hematuria, nausea, painful intercourse or urgency. She is sexually active.       The following portions of the patient's history were reviewed and updated as appropriate: allergies, current medications, past family history, past medical history, past social history, past surgical history and problem list.    Review of Systems   Constitutional: Negative for appetite change, chills, fatigue and fever.   Respiratory: Negative for apnea, cough, choking, chest tightness, shortness of breath, wheezing and stridor.    Cardiovascular: Negative for chest pain, palpitations and leg swelling.   Gastrointestinal: Negative for abdominal pain, constipation, diarrhea and nausea.   Genitourinary: Positive for amenorrhea, decreased  libido and urinary incontinence. Negative for breast discharge, breast lump, breast pain, decreased urine volume, difficulty urinating, dyspareunia, dysuria, flank pain, frequency, genital sores, hematuria, menstrual problem, pelvic pain, pelvic pressure, urgency, vaginal bleeding, vaginal discharge and vaginal pain.   Musculoskeletal: Negative for back pain.       Objective   Physical Exam  Vitals reviewed.   Constitutional:       General: She is awake. She is not in acute distress.     Appearance: Normal appearance. She is well-developed and normal weight. She is not ill-appearing, toxic-appearing or diaphoretic.   Cardiovascular:      Rate and Rhythm: Normal rate and regular rhythm.      Pulses: Normal pulses.      Heart sounds: Normal heart sounds.   Pulmonary:      Effort: Pulmonary effort is normal.      Breath sounds: Normal breath sounds.   Abdominal:      General: Bowel sounds are normal.   Skin:     General: Skin is warm and dry.   Neurological:      Mental Status: She is alert and easily aroused.   Psychiatric:         Behavior: Behavior is cooperative.           Assessment & Plan   Diagnoses and all orders for this visit:    1. Vasomotor symptoms due to menopause (Primary)    2. Urge incontinence of urine    3. Vaginal dryness, menopausal    Other orders  -     venlafaxine XR (Effexor XR) 37.5 MG 24 hr capsule; Take 1 capsule by mouth Daily.  Dispense: 30 capsule; Refill: 4  -     Estrogens Conjugated (Premarin) 0.625 MG/GM vaginal cream; Insert 1 mg into vaginal two times per week.  Dispense: 30 g; Refill: 3    Discussed option of starting HRT, along with RBA with patient. Patient states since her family history of breast cancer, she is afraid of starting HRT. She would like to proceed with stopping Paxil, starting Effexor and Premarin cream. RTC in 3 months for follow up. If no improvement may consider HRT of estrodil patch and provera to help control symptoms.         This document has been  electronically signed by ONEIDA Mcmillan on December 7, 2022 09:33 CST

## 2023-03-07 ENCOUNTER — OFFICE VISIT (OUTPATIENT)
Dept: OBSTETRICS AND GYNECOLOGY | Facility: CLINIC | Age: 53
End: 2023-03-07
Payer: COMMERCIAL

## 2023-03-07 VITALS
WEIGHT: 157 LBS | BODY MASS INDEX: 30.82 KG/M2 | DIASTOLIC BLOOD PRESSURE: 68 MMHG | SYSTOLIC BLOOD PRESSURE: 112 MMHG | HEIGHT: 60 IN

## 2023-03-07 DIAGNOSIS — N95.1 VASOMOTOR SYMPTOMS DUE TO MENOPAUSE: Primary | ICD-10-CM

## 2023-03-07 PROCEDURE — 99213 OFFICE O/P EST LOW 20 MIN: CPT

## 2023-03-07 NOTE — PROGRESS NOTES
Subjective   Karena Kent is a 53 y.o. Menopause issue follow up     History of Present Illness  LMP: Ablation  Pap: 5/2/2022 ASCUS, neg HPV  Mammo: 5/2/2022    Patient presents today for a follow up with starting Effexor 37.5 q day and Premarin 0.625mg vaginal cream 2xs per week. Pt stats she has not been using the Premarin cream. Reports after a week or 2 of taking the Effexor she felt better. Reports initially she had some trouble sleeping for 2-3 weeks, however, that has improved. She is still taking OTC Estro-G100, but is only taking it every other day. Reports vasomotor symptoms have improved. Night sweats are better, she does not wake up at night with them any more. She is not currently interested in HRT therapy.       Gynecologic Exam  The patient's pertinent negatives include no genital itching, genital lesions, genital odor, genital rash, missed menses, pelvic pain, vaginal bleeding or vaginal discharge. Pertinent negatives include no abdominal pain, back pain, chills, constipation, diarrhea, dysuria, fever, flank pain, frequency, hematuria, nausea or urgency.       The following portions of the patient's history were reviewed and updated as appropriate: allergies, current medications, past family history, past medical history, past social history, past surgical history and problem list.    Review of Systems   Constitutional: Negative for appetite change, chills, fatigue and fever.   Respiratory: Negative for apnea, cough, choking, chest tightness, shortness of breath, wheezing and stridor.    Cardiovascular: Negative for chest pain, palpitations and leg swelling.   Gastrointestinal: Negative for abdominal pain, constipation, diarrhea and nausea.   Genitourinary: Negative for amenorrhea, breast discharge, breast lump, breast pain, decreased libido, decreased urine volume, difficulty urinating, dyspareunia, dysuria, flank pain, frequency, genital sores, hematuria, menstrual problem, missed menses,  pelvic pain, pelvic pressure, urgency, urinary incontinence, vaginal bleeding, vaginal discharge and vaginal pain.   Musculoskeletal: Negative for back pain.       Objective   Physical Exam  Vitals reviewed.   Constitutional:       General: She is awake. She is not in acute distress.     Appearance: Normal appearance. She is well-developed and normal weight. She is not ill-appearing, toxic-appearing or diaphoretic.   Cardiovascular:      Rate and Rhythm: Normal rate and regular rhythm.      Pulses: Normal pulses.      Heart sounds: Normal heart sounds.   Pulmonary:      Effort: Pulmonary effort is normal.      Breath sounds: Normal breath sounds.   Abdominal:      General: Bowel sounds are normal.   Skin:     General: Skin is warm and dry.   Neurological:      Mental Status: She is alert and easily aroused.   Psychiatric:         Behavior: Behavior is cooperative.           Assessment & Plan   Diagnoses and all orders for this visit:    1. Vasomotor symptoms due to menopause (Primary)      Continue Effexor 37.5mg daily. If she feels this dose is no longer helping or symptoms reappear, will consider increasing dose to 75mg daily.   Pt states she is set to follow up with ONEIDA Garcia in May for an annual exam.           This document has been electronically signed by ONEIDA Mcmillan on March 7, 2023 08:54 CST

## 2023-05-04 ENCOUNTER — LAB (OUTPATIENT)
Dept: LAB | Facility: HOSPITAL | Age: 53
End: 2023-05-04
Payer: COMMERCIAL

## 2023-05-04 ENCOUNTER — OFFICE VISIT (OUTPATIENT)
Dept: FAMILY MEDICINE CLINIC | Facility: CLINIC | Age: 53
End: 2023-05-04
Payer: COMMERCIAL

## 2023-05-04 VITALS
BODY MASS INDEX: 30.43 KG/M2 | OXYGEN SATURATION: 99 % | DIASTOLIC BLOOD PRESSURE: 84 MMHG | HEART RATE: 69 BPM | HEIGHT: 60 IN | SYSTOLIC BLOOD PRESSURE: 120 MMHG | WEIGHT: 155 LBS

## 2023-05-04 DIAGNOSIS — Z01.419 PAP SMEAR, AS PART OF ROUTINE GYNECOLOGICAL EXAMINATION: Primary | ICD-10-CM

## 2023-05-04 DIAGNOSIS — Z78.0 POST-MENOPAUSAL: ICD-10-CM

## 2023-05-04 DIAGNOSIS — Z00.00 GENERAL MEDICAL EXAM: ICD-10-CM

## 2023-05-04 DIAGNOSIS — Z12.31 VISIT FOR SCREENING MAMMOGRAM: ICD-10-CM

## 2023-05-04 LAB
25(OH)D3 SERPL-MCNC: 59 NG/ML (ref 30–100)
ALBUMIN SERPL-MCNC: 4.8 G/DL (ref 3.5–5.2)
ALBUMIN/GLOB SERPL: 1.5 G/DL
ALP SERPL-CCNC: 106 U/L (ref 39–117)
ALT SERPL W P-5'-P-CCNC: 26 U/L (ref 1–33)
ANION GAP SERPL CALCULATED.3IONS-SCNC: 11 MMOL/L (ref 5–15)
AST SERPL-CCNC: 30 U/L (ref 1–32)
BASOPHILS # BLD AUTO: 0 10*3/MM3 (ref 0–0.2)
BASOPHILS NFR BLD AUTO: 0 % (ref 0–1.5)
BILIRUB SERPL-MCNC: 0.6 MG/DL (ref 0–1.2)
BUN SERPL-MCNC: 14 MG/DL (ref 6–20)
BUN/CREAT SERPL: 14.9 (ref 7–25)
CALCIUM SPEC-SCNC: 9.4 MG/DL (ref 8.6–10.5)
CHLORIDE SERPL-SCNC: 104 MMOL/L (ref 98–107)
CHOLEST SERPL-MCNC: 219 MG/DL (ref 0–200)
CO2 SERPL-SCNC: 25 MMOL/L (ref 22–29)
CREAT SERPL-MCNC: 0.94 MG/DL (ref 0.57–1)
DEPRECATED RDW RBC AUTO: 40.8 FL (ref 37–54)
EGFRCR SERPLBLD CKD-EPI 2021: 73.6 ML/MIN/1.73
EOSINOPHIL # BLD AUTO: 0 10*3/MM3 (ref 0–0.4)
EOSINOPHIL NFR BLD AUTO: 0 % (ref 0.3–6.2)
ERYTHROCYTE [DISTWIDTH] IN BLOOD BY AUTOMATED COUNT: 13 % (ref 12.3–15.4)
GLOBULIN UR ELPH-MCNC: 3.1 GM/DL
GLUCOSE SERPL-MCNC: 99 MG/DL (ref 65–99)
HBA1C MFR BLD: 5.4 % (ref 4.8–5.6)
HCT VFR BLD AUTO: 41.3 % (ref 34–46.6)
HDLC SERPL-MCNC: 70 MG/DL (ref 40–60)
HGB BLD-MCNC: 14 G/DL (ref 12–15.9)
IMM GRANULOCYTES # BLD AUTO: 0.01 10*3/MM3 (ref 0–0.05)
IMM GRANULOCYTES NFR BLD AUTO: 0.2 % (ref 0–0.5)
LDLC SERPL CALC-MCNC: 125 MG/DL (ref 0–100)
LDLC/HDLC SERPL: 1.74 {RATIO}
LYMPHOCYTES # BLD AUTO: 1.93 10*3/MM3 (ref 0.7–3.1)
LYMPHOCYTES NFR BLD AUTO: 31 % (ref 19.6–45.3)
MCH RBC QN AUTO: 29.7 PG (ref 26.6–33)
MCHC RBC AUTO-ENTMCNC: 33.9 G/DL (ref 31.5–35.7)
MCV RBC AUTO: 87.7 FL (ref 79–97)
MONOCYTES # BLD AUTO: 0.47 10*3/MM3 (ref 0.1–0.9)
MONOCYTES NFR BLD AUTO: 7.6 % (ref 5–12)
NEUTROPHILS NFR BLD AUTO: 3.81 10*3/MM3 (ref 1.7–7)
NEUTROPHILS NFR BLD AUTO: 61.2 % (ref 42.7–76)
NRBC BLD AUTO-RTO: 0 /100 WBC (ref 0–0.2)
PLATELET # BLD AUTO: 262 10*3/MM3 (ref 140–450)
PMV BLD AUTO: 11 FL (ref 6–12)
POTASSIUM SERPL-SCNC: 4.1 MMOL/L (ref 3.5–5.2)
PROT SERPL-MCNC: 7.9 G/DL (ref 6–8.5)
RBC # BLD AUTO: 4.71 10*6/MM3 (ref 3.77–5.28)
SODIUM SERPL-SCNC: 140 MMOL/L (ref 136–145)
TRIGL SERPL-MCNC: 136 MG/DL (ref 0–150)
TSH SERPL DL<=0.05 MIU/L-ACNC: 4.04 UIU/ML (ref 0.27–4.2)
VIT B12 BLD-MCNC: 812 PG/ML (ref 211–946)
VLDLC SERPL-MCNC: 24 MG/DL (ref 5–40)
WBC NRBC COR # BLD: 6.22 10*3/MM3 (ref 3.4–10.8)

## 2023-05-04 PROCEDURE — 83036 HEMOGLOBIN GLYCOSYLATED A1C: CPT | Performed by: NURSE PRACTITIONER

## 2023-05-04 PROCEDURE — 82607 VITAMIN B-12: CPT | Performed by: NURSE PRACTITIONER

## 2023-05-04 PROCEDURE — 80050 GENERAL HEALTH PANEL: CPT | Performed by: NURSE PRACTITIONER

## 2023-05-04 PROCEDURE — 36415 COLL VENOUS BLD VENIPUNCTURE: CPT | Performed by: NURSE PRACTITIONER

## 2023-05-04 PROCEDURE — 80061 LIPID PANEL: CPT | Performed by: NURSE PRACTITIONER

## 2023-05-04 PROCEDURE — 82306 VITAMIN D 25 HYDROXY: CPT | Performed by: NURSE PRACTITIONER

## 2023-05-04 RX ORDER — VENLAFAXINE HYDROCHLORIDE 37.5 MG/1
37.5 CAPSULE, EXTENDED RELEASE ORAL DAILY
Qty: 90 CAPSULE | Refills: 3 | Status: SHIPPED | OUTPATIENT
Start: 2023-05-04 | End: 2024-05-03

## 2023-05-04 NOTE — PROGRESS NOTES
Chief Complaint   Patient presents with   • Annual Exam     Subjective   Karena Kent is a 53 y.o. female.           History of Present Illness  General exam for health care needs-needs labs and meds   Gynecologic Exam  The problem has been resolved. The patient is experiencing no pain. She is not pregnant. Pertinent negatives include no fever, headaches, joint swelling, painful intercourse, rash or sore throat. She is sexually active. There is no history of an abdominal surgery, a  section, an ectopic pregnancy, endometriosis, a gynecological surgery, herpes simplex, miscarriage, ovarian cysts, perineal abscess, PID, an STD or a terminated pregnancy.        The following portions of the patient's history were reviewed and updated as appropriate: allergies, current medications, past social history and problem list.    Review of Systems   Constitutional: Positive for fatigue. Negative for activity change, appetite change, diaphoresis, fever and unexpected weight change.   HENT: Negative for congestion, dental problem, ear pain, facial swelling, hearing loss, mouth sores, nosebleeds, postnasal drip, rhinorrhea, sinus pressure, sneezing and sore throat.    Eyes: Negative.  Negative for photophobia, pain, discharge, redness, itching and visual disturbance.   Respiratory: Negative.  Negative for apnea, choking, chest tightness, shortness of breath, wheezing and stridor.    Cardiovascular: Negative.  Negative for palpitations and leg swelling.   Gastrointestinal: Negative.  Negative for abdominal distention, anal bleeding and blood in stool.   Endocrine: Negative.  Negative for cold intolerance, heat intolerance, polydipsia, polyphagia and polyuria.   Genitourinary: Negative.  Negative for difficulty urinating, dyspareunia, vaginal bleeding and vaginal pain.        Hx of hot flashes-controlled with effexor    Musculoskeletal: Negative.  Negative for arthralgias, gait problem, joint swelling, neck pain  "and neck stiffness.   Skin: Negative for rash.   Allergic/Immunologic: Negative.  Negative for food allergies and immunocompromised state.   Neurological: Negative.  Negative for dizziness, tremors, seizures, syncope, facial asymmetry, speech difficulty, weakness, light-headedness, numbness and headaches.   Hematological: Negative.  Negative for adenopathy. Does not bruise/bleed easily.   Psychiatric/Behavioral: Negative.  Negative for agitation, behavioral problems, confusion, decreased concentration, dysphoric mood and hallucinations. The patient is not nervous/anxious and is not hyperactive.    All other systems reviewed and are negative.      Objective   /84   Pulse 69   Ht 152.4 cm (60\")   Wt 70.3 kg (155 lb)   LMP  (LMP Unknown)   SpO2 99%   BMI 30.27 kg/m²   Physical Exam  Vitals and nursing note reviewed.   Constitutional:       General: She is not in acute distress.     Appearance: Normal appearance. She is not ill-appearing, toxic-appearing or diaphoretic.   HENT:      Head: Normocephalic and atraumatic.      Right Ear: Tympanic membrane normal. There is no impacted cerumen.      Left Ear: There is no impacted cerumen.      Nose: No congestion or rhinorrhea.      Mouth/Throat:      Mouth: Mucous membranes are moist.      Pharynx: No oropharyngeal exudate or posterior oropharyngeal erythema.   Eyes:      Pupils: Pupils are equal, round, and reactive to light.   Neck:      Vascular: No carotid bruit.   Cardiovascular:      Rate and Rhythm: Normal rate and regular rhythm.      Pulses: Normal pulses.      Heart sounds: No murmur heard.    No friction rub. No gallop.   Pulmonary:      Effort: Pulmonary effort is normal. No respiratory distress.      Breath sounds: No stridor. No wheezing, rhonchi or rales.   Chest:      Chest wall: No tenderness.   Breasts:     Right: Normal. No swelling, bleeding, inverted nipple, mass or nipple discharge.      Left: Normal. No swelling, bleeding, inverted nipple, " mass or nipple discharge.   Abdominal:      General: Abdomen is flat. There is no distension.      Palpations: There is no mass.      Tenderness: There is no abdominal tenderness. There is no guarding or rebound.      Hernia: No hernia is present.   Genitourinary:     Vagina: Normal.      Cervix: Normal.      Uterus: Normal.       Adnexa: Right adnexa normal and left adnexa normal.      Rectum: Normal. Guaiac result negative. No mass, tenderness, anal fissure, external hemorrhoid or internal hemorrhoid. Normal anal tone.   Musculoskeletal:         General: No swelling, tenderness, deformity or signs of injury. Normal range of motion.      Cervical back: Normal range of motion and neck supple. No rigidity or tenderness.      Right lower leg: No edema.      Left lower leg: No edema.   Lymphadenopathy:      Cervical: No cervical adenopathy.      Upper Body:      Right upper body: No supraclavicular, axillary or pectoral adenopathy.      Left upper body: No axillary or pectoral adenopathy.   Skin:     General: Skin is warm.      Coloration: Skin is not jaundiced or pale.      Findings: No bruising, erythema, lesion or rash.   Neurological:      General: No focal deficit present.      Mental Status: She is alert and oriented to person, place, and time.      Cranial Nerves: No cranial nerve deficit.      Sensory: No sensory deficit.      Motor: No weakness.      Coordination: Coordination normal.   Psychiatric:         Mood and Affect: Mood normal.         Behavior: Behavior normal.              Assessment & Plan     Problems Addressed this Visit    None  Visit Diagnoses     Pap smear, as part of routine gynecological examination    -  Primary    Relevant Orders    LIQUID-BASED PAP SMEAR, [facility] ([facility])    Visit for screening mammogram        Relevant Orders    Mammo Screening Digital Tomosynthesis Bilateral With CAD    General medical exam        Relevant Orders    CBC & Differential    Comprehensive Metabolic  Panel    Hemoglobin A1c    Lipid Panel    Vitamin D,25-Hydroxy    Vitamin B12    TSH    Post-menopausal        Relevant Orders    DEXA Bone Density Axial      Diagnoses       Codes Comments    Pap smear, as part of routine gynecological examination    -  Primary ICD-10-CM: Z01.419  ICD-9-CM: V76.2     Visit for screening mammogram     ICD-10-CM: Z12.31  ICD-9-CM: V76.12     General medical exam     ICD-10-CM: Z00.00  ICD-9-CM: V70.9     Post-menopausal     ICD-10-CM: Z78.0  ICD-9-CM: V49.81            New Medications Ordered This Visit   Medications   • venlafaxine XR (Effexor XR) 37.5 MG 24 hr capsule     Sig: Take 1 capsule by mouth Daily.     Dispense:  90 capsule     Refill:  3     Current Outpatient Medications on File Prior to Visit   Medication Sig Dispense Refill   • Cholecalciferol (Vitamin D3) 125 MCG (5000 UT) tablet dispersible Place 1 tablet on the tongue Daily.     • COLLAGEN PO Take  by mouth.     • ibuprofen (ADVIL,MOTRIN) 400 MG tablet Take 1 tablet by mouth Every 6 (Six) Hours As Needed for Mild Pain.     • LACTOBACILLUS RHAMNOSUS, GG, PO      • Multiple Vitamins-Minerals (MULTIVITAMIN ADULT PO) Take  by mouth.     • [DISCONTINUED] venlafaxine XR (Effexor XR) 37.5 MG 24 hr capsule Take 1 capsule by mouth Daily. 30 capsule 4     No current facility-administered medications on file prior to visit.       20 minutes  Follow Up   Return in about 1 year (around 5/4/2024) for Next scheduled follow up.        Refill effexor, labs , mammogram and bone density suggested -diet discussed as directed, meds as directed

## 2023-05-05 ENCOUNTER — TELEPHONE (OUTPATIENT)
Dept: FAMILY MEDICINE CLINIC | Facility: CLINIC | Age: 53
End: 2023-05-05
Payer: COMMERCIAL

## 2023-05-05 NOTE — TELEPHONE ENCOUNTER
Per ONEIDA Dempsey, Ms. Kent has been called with recent DEXA Bone Density Scan results & recommendations.  Continue current medications and follow-up as planned or sooner if any problems.     She said she would do some checking on the Fosamax and let us know if she wants to try that or just add calcium along with her Vitamin D that she is already on.      ----- Message from ONEIDA Davis sent at 5/5/2023  8:02 AM CDT -----  Can you let her know she has osteopenia -consider fosamax 70 weekly

## 2023-05-05 NOTE — PROGRESS NOTES
Per ONEIDA Dempsey, Ms. Kent has been called with recent DEXA Bone Density Scan results & recommendations.  Continue current medications and follow-up as planned or sooner if any problems.     She said she would do some checking on the Fosamax and let us know if she wants to try that or just add calcium along with her Vitamin D that she is already on.

## 2023-05-05 NOTE — PROGRESS NOTES
Per ONEIDA Dempsey, Ms. Kent  has been called with recent lab results & recommendations.  Continue current medications and follow-up as planned or sooner if any problems.     She wants to try diet and exercise for now.

## 2023-05-05 NOTE — TELEPHONE ENCOUNTER
Per ONEIDA Dempsey, Ms. Kent  has been called with recent lab results & recommendations.  Continue current medications and follow-up as planned or sooner if any problems.     She wants to try diet and exercise for now.       ----- Message from ONEIDA Davis sent at 5/5/2023  7:44 AM CDT -----  Can you let her know cholesterol is bad otherwise labs are good -see if she wants to proceed with cholesterol meds or eat better for now, no processed foods

## 2023-05-09 LAB — REF LAB TEST METHOD: NORMAL

## 2023-08-09 NOTE — PROGRESS NOTES
Incoming call received from Jasmin with the [facility]. Recommendations per their office for patient's insomnia are to increase Lorazepam to 2mg at night. Anti-histamines are contraindicated. Patient is scheduled for follow up with Lissette NP with the [facility] 12/13/23.   Per ONEIDA Garcia, Ms. Kent has been called with recent lab results & recommendations.  Continue current medications and follow-up as planned or sooner if any problems.